# Patient Record
Sex: FEMALE | Race: BLACK OR AFRICAN AMERICAN | NOT HISPANIC OR LATINO | Employment: STUDENT | URBAN - METROPOLITAN AREA
[De-identification: names, ages, dates, MRNs, and addresses within clinical notes are randomized per-mention and may not be internally consistent; named-entity substitution may affect disease eponyms.]

---
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
## 2022-07-26 ENCOUNTER — OFFICE VISIT (OUTPATIENT)
Dept: OBSTETRICS AND GYNECOLOGY | Age: 22
End: 2022-07-26

## 2022-07-26 VITALS
HEIGHT: 65 IN | BODY MASS INDEX: 22.82 KG/M2 | WEIGHT: 137 LBS | SYSTOLIC BLOOD PRESSURE: 118 MMHG | DIASTOLIC BLOOD PRESSURE: 70 MMHG

## 2022-07-26 DIAGNOSIS — Z11.3 SCREEN FOR STD (SEXUALLY TRANSMITTED DISEASE): ICD-10-CM

## 2022-07-26 DIAGNOSIS — Z30.09 CONTRACEPTIVE EDUCATION: Primary | ICD-10-CM

## 2022-07-26 PROCEDURE — 99213 OFFICE O/P EST LOW 20 MIN: CPT | Performed by: PHYSICIAN ASSISTANT

## 2022-07-26 RX ORDER — NORETHINDRONE ACETATE AND ETHINYL ESTRADIOL AND FERROUS FUMARATE 1MG-20(24)
1 KIT ORAL DAILY
Qty: 28 TABLET | Refills: 12 | Status: SHIPPED | OUTPATIENT
Start: 2022-07-26 | End: 2022-11-30

## 2022-07-26 NOTE — PROGRESS NOTES
"Subjective     Chief Complaint   Patient presents with   • Consult     New pt consult for birth control.  Was using the patch but stopped recently.       Gianluca Leyva is a 22 y.o.  whose LMP is Patient's last menstrual period was 2022 (approximate). presents with questions about contraception    She did see a gynecologist last year and was put on a patch but started having btb on it and stopped it  Has been on pills before and may be open to retrying them  Also considering nexplanon    Med hx-osteogenesis imperfecta    Dr Callahan is pcp    She is from [place]  Attending , [employer], public health    No Additional Complaints Reported    The following portions of the patient's history were reviewed and updated as appropriate:vital signs, allergies, current medications, past family history, past medical history, past social history, past surgical history and problem list      Review of Systems   Genitourinary:positive for contraceptive counseling     Objective      /70   Ht 165.1 cm (65\")   Wt 62.1 kg (137 lb)   LMP 2022 (Approximate)   Breastfeeding No   BMI 22.80 kg/m²     Physical Exam    General:   alert, comfortable and no distress   Heart: Not performed today   Lungs: Not performed today.   Breast: Not performed today   Neck: Not performed today   Abdomen: Not performed today   CVA: Not performed today   Pelvis: Not performed today   Extremities: Not performed today   Neurologic: negative   Psychiatric: Normal affect, judgement, and mood       Lab Review   Labs: No data reviewed    Imaging   No data reviewed    Assessment & Plan     ASSESSMENT  1. Contraceptive education    2. Screen for STD (sexually transmitted disease)          PLAN  1.   Orders Placed This Encounter   Procedures   • Chlamydia trachomatis, Neisseria gonorrhoeae, PCR - Swab, Cervix       2. Medications prescribed this encounter:        New Medications Ordered This Visit   Medications   • norethindrone-ethinyl " estradiol-ferrous fumarate (LOESTIN 24 FE) 1-20 MG-MCG(24) per tablet     Sig: Take 1 tablet by mouth Daily.     Dispense:  28 tablet     Refill:  12       3. Plan bcp. New rx sent in. Birth control risks were discussed with patient including risks of blood clots, strokes and DVT's. Also discussed proper use, start pill on first day of menses.  Use for one full month prior to having unprotected intercourse. BCP do not prevent against STD's, therefore condoms should be used at all times. Pt can expect irregular bleeding the first three months of use.     Sign PW for nexplanon. May consider having this placed if not tolerating bcp    Follow up: 1 year(s)    SOLIS Lovell  7/26/2022

## 2022-07-28 LAB
C TRACH RRNA SPEC QL NAA+PROBE: NEGATIVE
N GONORRHOEA RRNA SPEC QL NAA+PROBE: NEGATIVE

## 2022-10-12 ENCOUNTER — OFFICE VISIT (OUTPATIENT)
Dept: OBSTETRICS AND GYNECOLOGY | Age: 22
End: 2022-10-12

## 2022-10-12 VITALS
DIASTOLIC BLOOD PRESSURE: 62 MMHG | BODY MASS INDEX: 22.92 KG/M2 | SYSTOLIC BLOOD PRESSURE: 110 MMHG | WEIGHT: 137.6 LBS | HEIGHT: 65 IN

## 2022-10-12 DIAGNOSIS — Z3A.01 LESS THAN 8 WEEKS GESTATION OF PREGNANCY: ICD-10-CM

## 2022-10-12 DIAGNOSIS — Q78.0 OSTEOGENESIS IMPERFECTA: Primary | ICD-10-CM

## 2022-10-12 DIAGNOSIS — Q78.0 OSTEOGENESIS IMPERFECTA: ICD-10-CM

## 2022-10-12 DIAGNOSIS — Z11.3 SCREEN FOR STD (SEXUALLY TRANSMITTED DISEASE): ICD-10-CM

## 2022-10-12 DIAGNOSIS — Z34.90 EARLY STAGE OF PREGNANCY: Primary | ICD-10-CM

## 2022-10-12 PROCEDURE — 90471 IMMUNIZATION ADMIN: CPT | Performed by: PHYSICIAN ASSISTANT

## 2022-10-12 PROCEDURE — 90686 IIV4 VACC NO PRSV 0.5 ML IM: CPT | Performed by: PHYSICIAN ASSISTANT

## 2022-10-12 PROCEDURE — 99213 OFFICE O/P EST LOW 20 MIN: CPT | Performed by: PHYSICIAN ASSISTANT

## 2022-10-12 NOTE — PROGRESS NOTES
"Subjective     Chief Complaint   Patient presents with   • Follow-up     GYN F/U for Pregnancy confirmation, LMP 2022, U/S today, Pt has no complaints today        Gianluca Leyva is a 22 y.o.  whose LMP is Patient's last menstrual period was 2022. presents for new ob visit  She is accompanied by mom  This is unplanned  FOB is involved but she prefers not to give his info    First pregnancy  Med hx-osteogenesis imperfecta-she has h/o over 200 fxs    Has mild nausea and fatigue   Just sleep and declines need for meds     No Additional Complaints Reported    The following portions of the patient's history were reviewed and updated as appropriate:vital signs, allergies, current medications, past family history, past medical history, past social history, past surgical history and problem list      Review of Systems   Genitourinary:positive for early stage of pregnancy     Objective      /62   Ht 165.1 cm (65\")   Wt 62.4 kg (137 lb 9.6 oz)   LMP 2022   BMI 22.90 kg/m²     Physical Exam    General:   alert, comfortable and no distress   Heart: Not performed today   Lungs: Not performed today.   Breast: Not performed today   Neck: Not performed today   Abdomen: Not performed today   CVA: Not performed today   Pelvis: Not performed today   Extremities: Not performed today   Neurologic: negative   Psychiatric: Normal affect, judgement, and mood       Lab Review   Labs: No data reviewed    Imaging   Ultrasound - Pelvic Vaginal    Intrauterine pregnancy at 7 wks 6 days  Viable first trimester gestation,    KAREN based on u/s is 2023  KAREN based on LMP is 2023    Assessment & Plan     ASSESSMENT  1. Early stage of pregnancy    2. Screen for STD (sexually transmitted disease)    3. Osteogenesis imperfecta          PLAN  1.   Orders Placed This Encounter   Procedures   • Chlamydia trachomatis, Neisseria gonorrhoeae, PCR - Urine, Urine, Clean Catch   • FluLaval/Fluzone >6 mos " ([id number])   • OB Panel With HIV       2. Plan prenatal labs and rpt std testing today. U/s done and is c/w dates. Pt notes mild nausea and fatigue. Is taking PNV. utd on covid vaccine. Flu vaccine received today.   Briefly disc Osteogenesis imperfecta and pregnancy. May require MFM consult  Mom is here with pt and notes that she did have  with all 4 children. 3 out of 4 children do have OI    Follow up: 3 week(s)    SOLIS Lovell  10/12/2022

## 2022-10-13 LAB
ABO GROUP BLD: NORMAL
BASOPHILS # BLD AUTO: NORMAL X10E3/UL
BASOPHILS NFR BLD AUTO: NORMAL %
BLD GP AB SCN SERPL QL: NEGATIVE
EOSINOPHIL # BLD AUTO: NORMAL X10E3/UL
EOSINOPHIL NFR BLD AUTO: NORMAL %
ERYTHROCYTE [DISTWIDTH] IN BLOOD BY AUTOMATED COUNT: NORMAL %
HBV SURFACE AG SERPL QL IA: NEGATIVE
HCT VFR BLD AUTO: NORMAL %
HCV AB S/CO SERPL IA: <0.1 S/CO RATIO (ref 0–0.9)
HGB BLD-MCNC: NORMAL G/DL
HIV 1+2 AB+HIV1 P24 AG SERPL QL IA: NON REACTIVE
IMM GRANULOCYTES # BLD AUTO: NORMAL X10E3/UL
IMM GRANULOCYTES NFR BLD AUTO: NORMAL %
IMMATURE CELLS: NORMAL
LYMPHOCYTES # BLD AUTO: NORMAL X10E3/UL
LYMPHOCYTES NFR BLD AUTO: NORMAL %
MCH RBC QN AUTO: NORMAL PG
MCHC RBC AUTO-ENTMCNC: NORMAL G/DL
MCV RBC AUTO: NORMAL FL
MONOCYTES # BLD AUTO: NORMAL X10E3/UL
MONOCYTES NFR BLD AUTO: NORMAL %
MORPHOLOGY BLD-IMP: NORMAL
NEUTROPHILS # BLD AUTO: NORMAL X10E3/UL
NEUTROPHILS NFR BLD AUTO: NORMAL %
NRBC BLD AUTO-RTO: NORMAL %
PLATELET # BLD AUTO: NORMAL X10E3/UL
RBC # BLD AUTO: NORMAL X10E6/UL
REQUEST PROBLEM: NORMAL
RH BLD: POSITIVE
RPR SER QL: NON REACTIVE
RUBV IGG SERPL IA-ACNC: 3.33 INDEX
WBC # BLD AUTO: NORMAL X10E3/UL

## 2022-10-14 LAB
C TRACH RRNA SPEC QL NAA+PROBE: NEGATIVE
N GONORRHOEA RRNA SPEC QL NAA+PROBE: NEGATIVE

## 2022-10-18 ENCOUNTER — APPOINTMENT (OUTPATIENT)
Dept: ULTRASOUND IMAGING | Facility: HOSPITAL | Age: 22
End: 2022-10-18

## 2022-10-19 ENCOUNTER — APPOINTMENT (OUTPATIENT)
Dept: ULTRASOUND IMAGING | Facility: HOSPITAL | Age: 22
End: 2022-10-19

## 2022-10-21 ENCOUNTER — OFFICE VISIT (OUTPATIENT)
Dept: OBSTETRICS AND GYNECOLOGY | Facility: CLINIC | Age: 22
End: 2022-10-21

## 2022-10-21 ENCOUNTER — HOSPITAL ENCOUNTER (OUTPATIENT)
Dept: ULTRASOUND IMAGING | Facility: HOSPITAL | Age: 22
Discharge: HOME | End: 2022-10-21
Admitting: STUDENT IN AN ORGANIZED HEALTH CARE EDUCATION/TRAINING PROGRAM

## 2022-10-21 VITALS
TEMPERATURE: 98.6 F | DIASTOLIC BLOOD PRESSURE: 67 MMHG | SYSTOLIC BLOOD PRESSURE: 114 MMHG | BODY MASS INDEX: 23.29 KG/M2 | WEIGHT: 139.8 LBS | HEART RATE: 69 BPM | HEIGHT: 65 IN

## 2022-10-21 DIAGNOSIS — Z82.79 FAMILY HISTORY OF OSTEOGENESIS IMPERFECTA: ICD-10-CM

## 2022-10-21 DIAGNOSIS — Q78.0 OSTEOGENESIS IMPERFECTA TYPE I: Primary | ICD-10-CM

## 2022-10-21 DIAGNOSIS — Z3A.01 LESS THAN 8 WEEKS GESTATION OF PREGNANCY: ICD-10-CM

## 2022-10-21 DIAGNOSIS — Q78.0 OSTEOGENESIS IMPERFECTA: ICD-10-CM

## 2022-10-21 PROCEDURE — 99215 OFFICE O/P EST HI 40 MIN: CPT | Performed by: OBSTETRICS & GYNECOLOGY

## 2022-10-21 PROCEDURE — 76801 OB US < 14 WKS SINGLE FETUS: CPT | Performed by: OBSTETRICS & GYNECOLOGY

## 2022-10-21 PROCEDURE — 76801 OB US < 14 WKS SINGLE FETUS: CPT

## 2022-10-21 NOTE — PROGRESS NOTES
Pt. Reports that she is doing well and denies vaginal bleeding, cramping, contractions or leaking of fluid at this time. Reports no fetal movement at this time.  Denies headache, visual changes or epigastric pain.  Denies any additional complaints at time of appointment.  Next OB appointment scheduled for 11/2/2022.    Vitals:    10/21/22 0954   BP: 114/67   Pulse: 69   Temp: 98.6 °F (37 °C)

## 2022-10-21 NOTE — PROGRESS NOTES
[facility] Consult    Dear Dr. Judge   Thank-you for referring Gianluca Leyva for a Maternal Fetal Medicine consult . As you know Ms. Gianluca Leyva is a 22 y.o.  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 Patient is 9.2 weeks today.     Family hx with a strong hx of OI Type 1 aka classic nondeforming OI.  Patient brought her mother in today who also has OI type I.  Additionally 3 of her other siblings have OI and her maternal grandmother.  Her mother and siblings course and had issues with fractures her mother did have all vaginal deliveries and a history of a  delivery with the patient.    Patient has a history of multiple fractures the patient's mother states she has had more fractures than her other kids however she was very active in sports during her life.    Patient denies any obstetrical issues or problems today.  States the father the baby does not have any history of OI.            OB History    Para Term  AB Living   1 0 0 0 0 0   SAB IAB Ectopic Molar Multiple Live Births   0 0 0 0 0 0      # Outcome Date GA Lbr Bigg/2nd Weight Sex Delivery Anes PTL Lv   1 Current              The remainder of her pregnancy has been uncomplicated.   has a past medical history of Osteogenesis imperfecta.   has a past surgical history that includes Knee Arthroplasty; Shoulder arthroscopy; and Elbow Arthroplasty.  No Known Allergies    Current Outpatient Medications:   •  norethindrone-ethinyl estradiol-ferrous fumarate (LOESTIN 24 FE) 1-20 MG-MCG(24) per tablet, Take 1 tablet by mouth Daily., Disp: 28 tablet, Rfl: 12  family history is not on file.  Social History     Tobacco Use   • Smoking status: Never   • Smokeless tobacco: Never   Vaping Use   • Vaping Use: Never used   Substance Use Topics   • Alcohol use: Never   • Drug use: Never       PHYSICAL EXAM:  /67 (BP Location: Right arm, Patient Position: Sitting)   Pulse 69   Temp 98.6 °F (37 °C)  "(Temporal)   Ht 165.1 cm (65\")   Wt 63.4 kg (139 lb 12.8 oz)   LMP 2022   BMI 23.26 kg/m²   General: pleasant, alert and oriented  Abdomen: soft, non tender, gravid  Extremities: bilat lower extremities soft, non tender, no edema noted    Ultrasound   Ultrasound evaluation reveals a fetus measuring 9 1/7 weeks .  Normal cardiac activity.       ASSESSMENT:    Diagnoses and all orders for this visit:    1. Osteogenesis imperfecta type I (Primary)  Overview:  Hx of multiple fractures      2. Family history of osteogenesis imperfecta  Overview:  Patient's mother, three other siblings, and maternal grandmother         Recommendations:  Osteogenesis imperfecta type I (COLIA1/2-OI): This is also known as classic nondeforming OI with  blue sclera.  Inheritance is autosomal dominant as you can see with the patient and her family history.  Typically patients have multiple fractures.  They can be normal to short stature, with blue sclera.  Also hearing loss is a possibility as they get into adulthood.     During pregnancy there is any increased risk and joint laxity and there is some increased risk in  labor.  There does not seem to be any difference in frequency of complications between a vaginal delivery and a .  Therefore I would recommend a vaginal delivery and a  only for other obstetrical or maternal reasons. Anesthesia needs to be aware of this patient at the time of admission and labor and delivery.    Since this is the patient's first pregnancy I would recommend serial cervical lengths beginning around 15 weeks gestation all the way till 25 weeks gestation.    I talked briefly with this patient about prenatal diagnosis by means of a CVS or amniocentesis.  I am looking further in to this information and then will relate to the patient. I would need to the mutation. However prenatal diagnosis will not predict severity.    Patient's mom stated that the patient had a vitamin D deficiency " and was on vitamin D for some time.  I would recommend checking a vitamin D level with her prenatal labs and then treating accordingly and if her vitamin D levels are normal I would at least place her on 2000 international units daily.    All of their questions were answered to completion.  Plan for follow-up visit in 3 weeks.        Evelyn Sweet, DO FACOG  Maternal Fetal Medicine     Again thank-you for referring for a maternal fetal medicine consult. If we can be of any further assistance to you please do not hesitate to contact us.    Total consult time 50 minutes with >50% spent face to face and reviewing labs, charting and coordination of care.

## 2022-11-02 ENCOUNTER — INITIAL PRENATAL (OUTPATIENT)
Dept: OBSTETRICS AND GYNECOLOGY | Age: 22
End: 2022-11-02

## 2022-11-02 VITALS — DIASTOLIC BLOOD PRESSURE: 66 MMHG | SYSTOLIC BLOOD PRESSURE: 120 MMHG | BODY MASS INDEX: 22.96 KG/M2 | WEIGHT: 138 LBS

## 2022-11-02 DIAGNOSIS — Q78.0 OSTEOGENESIS IMPERFECTA: ICD-10-CM

## 2022-11-02 DIAGNOSIS — Q78.0 OSTEOGENESIS IMPERFECTA TYPE I: ICD-10-CM

## 2022-11-02 DIAGNOSIS — E55.9 VITAMIN D DEFICIENCY: ICD-10-CM

## 2022-11-02 DIAGNOSIS — Z3A.11 11 WEEKS GESTATION OF PREGNANCY: Primary | ICD-10-CM

## 2022-11-02 DIAGNOSIS — Z13.89 SCREENING FOR HEMATURIA OR PROTEINURIA: ICD-10-CM

## 2022-11-02 LAB
CLARITY, POC: CLEAR
COLOR UR: YELLOW
GLUCOSE UR STRIP-MCNC: NEGATIVE MG/DL
PROT UR STRIP-MCNC: NEGATIVE MG/DL

## 2022-11-02 PROCEDURE — 0501F PRENATAL FLOW SHEET: CPT | Performed by: STUDENT IN AN ORGANIZED HEALTH CARE EDUCATION/TRAINING PROGRAM

## 2022-11-02 NOTE — PROGRESS NOTES
[facility]   Obstetrics and Gynecology   New Obstetric Visit    2022    Patient: Gianluca Leyva          MR#:[id number]    History of Present Illness    Chief Complaint   Patient presents with   • Initial Prenatal Visit     OB intake, 11w0d, No problens today       22 y.o. female  at 11w0d presents for NOB visit.  She is doing well today.  Taking prenatal vitamins.  Here with FOB Jason.  They are excited but nervous about pregnancy.    Patient has personal history of osteogenesis imperfecta with multiple fractures.    ________________________________________  Patient Active Problem List   Diagnosis   • Osteogenesis imperfecta type I   • Family history of osteogenesis imperfecta   • 11 weeks gestation of pregnancy   • Vitamin D deficiency     OB History        1    Para   0    Term   0       0    AB   0    Living   0       SAB   0    IAB   0    Ectopic   0    Molar   0    Multiple   0    Live Births   0                Social History:  Denies h/o gonorrhea, chlamydia, herpes, syphilis, HIV  Strong FHx osteogenesis imperfecta    Past Medical History:   Diagnosis Date   • Osteogenesis imperfecta      Past Surgical History:   Procedure Laterality Date   • ELBOW ARTHROPLASTY     • KNEE ARTHROPLASTY     • SHOULDER ARTHROSCOPY       Social History     Tobacco Use   Smoking Status Never   Smokeless Tobacco Never     Family History   Problem Relation Age of Onset   • No Known Problems Father    • Osteogenesis imperfecta Mother    • Osteogenesis imperfecta Brother    • Osteogenesis imperfecta Brother    • Breast cancer Neg Hx    • Ovarian cancer Neg Hx    • Uterine cancer Neg Hx    • Colon cancer Neg Hx      Prior to Admission medications    Medication Sig Start Date End Date Taking? Authorizing Provider   Prenatal MV & Min w/FA-DHA (PRENATAL ADULT GUMMY/DHA/FA PO) Take 1 tablet by mouth Daily.   Yes Provider, MD Jeancarlos   norethindrone-ethinyl estradiol-ferrous fumarate (LOESTIN 24  "FE) 1-20 MG-MCG(24) per tablet Take 1 tablet by mouth Daily. 7/26/22 7/26/23  Sherlyn Mcpherson PA     ________________________________________    The following portions of the patient's history were reviewed and updated as appropriate: allergies, current medications, past family history, past medical history, past social history, past surgical history and problem list.    Review of Systems   All other systems reviewed and are negative.           Objective     /66   Wt 62.6 kg (138 lb)   LMP 08/17/2022   BMI 22.96 kg/m²    BP Readings from Last 3 Encounters:   11/02/22 120/66   10/21/22 114/67   10/12/22 110/62      Wt Readings from Last 3 Encounters:   11/02/22 62.6 kg (138 lb)   10/21/22 63.4 kg (139 lb 12.8 oz)   10/12/22 62.4 kg (137 lb 9.6 oz)        BMI: Estimated body mass index is 22.96 kg/m² as calculated from the following:    Height as of 10/21/22: 165.1 cm (65\").    Weight as of this encounter: 62.6 kg (138 lb).    Physical Exam  Vitals and nursing note reviewed.   Constitutional:       General: She is not in acute distress.     Appearance: Normal appearance.   Pulmonary:      Effort: Pulmonary effort is normal. No respiratory distress.   Abdominal:      General: There is no distension.      Palpations: Abdomen is soft.      Tenderness: There is no abdominal tenderness.   Musculoskeletal:         General: Normal range of motion.   Skin:     General: Skin is warm and dry.   Neurological:      General: No focal deficit present.      Mental Status: She is alert.   Psychiatric:         Mood and Affect: Mood normal.         Behavior: Behavior normal.         Thought Content: Thought content normal.         Judgment: Judgment normal.           Assessment:  Diagnoses and all orders for this visit:    1. 11 weeks gestation of pregnancy (Primary)  Overview:  -PNL: Rh pos, infectious testing normal, CBC today  -Pap: n/a  -Genetics: screening options reviewed, Bessie pamphlet provided, desires " cfDNA/carrier screen today, plan for msAFP > 16wga, plan for anatomy Us at 18-22 wga with [facility]  -Imm: RI, mary unk (collect with next blood draw), tdap > 27wga, s/p covid vaccine, rec'd booster, s/p flu shot  -Contraception: discuss at future visit  -Birthplan: discuss at future visit  -Care coordination: accepts blood transfusions, no latex allergy, call schedule reviewed      Orders:  -     CBC (No Diff)  -     Vitamin D 25 Hydroxy  -     Cancel: Bessie Panorama Prenatal Test Full Panel: Panorama Test Plus 5 Additional Microdeletions - Blood,  -     Cancel: Bessie Horizon 14 (Pan-Ethnic Standard) - Blood,  -     Cancel: Bessie Panorama Prenatal Test: Chromosomes 13, 18, 21, X & Y: Triploidy 22Q.11.2 Deletion - Blood,  -     Cancel: Bessie Horizon 14 (Pan-Ethnic Standard) - Blood,  -     Cancel: Bessie Panorama Prenatal Test: Chromosomes 13, 18, 21, X & Y: Triploidy 22Q.11.2 Deletion - Blood,  -     Bessie Horizon 14 (Pan-Ethnic Standard) - Blood, Blood, Venous  -     Bessie Panorama Prenatal Test: Chromosomes 13, 18, 21, X & Y: Triploidy 22Q.11.2 Deletion - Blood, Blood, Venous    2. Screening for hematuria or proteinuria  -     POC Urinalysis Dipstick  -     Urine Culture - Urine, Urine, Clean Catch    3. Osteogenesis imperfecta  -     CBC (No Diff)  -     Vitamin D 25 Hydroxy  -     Cancel: Bessie Panorama Prenatal Test: Chromosomes 13, 18, 21, X & Y: Triploidy 22Q.11.2 Deletion - Blood,  -     Cancel: Bessie Panorama Prenatal Test: Chromosomes 13, 18, 21, X & Y: Triploidy 22Q.11.2 Deletion - Blood,    4. Osteogenesis imperfecta type I  Overview:  -Patient, mother, and her two brothers all with OI, h/o multiple fractures  -S/p [facility] consult - recs for vaginal delivery unless other obstetric indication for , also recs for CL screening from 15-25 wga, unclear if this will be at [facility] office or my office but scheduled to see M  and will clarify  -MFM discussed prenatal dx of OI with CVS vs amnio, patient  declined CVS but will consider amnio      5. Vitamin D deficiency  Overview:  -reported h/o vit D deficiency  -check vit D level today and will supplement if necessary            Plan:  Return in about 4 weeks (around 11/30/2022) for Next f/u.      Kim Judge MD  11/2/2022 15:54 EDT

## 2022-11-03 LAB
25(OH)D3+25(OH)D2 SERPL-MCNC: 25.2 NG/ML (ref 30–100)
ERYTHROCYTE [DISTWIDTH] IN BLOOD BY AUTOMATED COUNT: 12.4 % (ref 12.3–15.4)
HCT VFR BLD AUTO: 37.9 % (ref 34–46.6)
HGB BLD-MCNC: 13.1 G/DL (ref 12–15.9)
MCH RBC QN AUTO: 31.6 PG (ref 26.6–33)
MCHC RBC AUTO-ENTMCNC: 34.6 G/DL (ref 31.5–35.7)
MCV RBC AUTO: 91.3 FL (ref 79–97)
PLATELET # BLD AUTO: 318 10*3/MM3 (ref 140–450)
RBC # BLD AUTO: 4.15 10*6/MM3 (ref 3.77–5.28)
WBC # BLD AUTO: 6.98 10*3/MM3 (ref 3.4–10.8)

## 2022-11-04 LAB
BACTERIA UR CULT: NORMAL
BACTERIA UR CULT: NORMAL

## 2022-11-04 RX ORDER — ERGOCALCIFEROL 1.25 MG/1
50000 CAPSULE ORAL WEEKLY
Qty: 12 CAPSULE | Refills: 3 | Status: SHIPPED | OUTPATIENT
Start: 2022-11-04 | End: 2023-02-20

## 2022-11-07 NOTE — PROGRESS NOTES
MATERNAL FETAL MEDICINE Consult Note    Dear Dr Kim Judge MD:    Thank you for your kind referral of Gianluca Leyva.  As you know, she is a 22 y.o. G1  P  at  12 2 /7 weeks gestation (Estimated Date of Delivery: 5/24/23). This is a consult.      Her antepartum course is complicated by:  Maternal Osteogenesis imperfecta Type 1    Aneuploidy Screening:      HPI: Today, she denies headache, blurry vision, RUQ pain. No vaginal bleeding, no contractions.     Review of History:  Past Medical History:   Diagnosis Date   • Osteogenesis imperfecta      Past Surgical History:   Procedure Laterality Date   • ELBOW ARTHROPLASTY     • KNEE ARTHROPLASTY     • MUSCLE REPAIR      shoulder   • SHOULDER ARTHROSCOPY         OB Hx:    Social History     Socioeconomic History   • Marital status: Single   Tobacco Use   • Smoking status: Never   • Smokeless tobacco: Never   Vaping Use   • Vaping Use: Never used   Substance and Sexual Activity   • Alcohol use: Never   • Drug use: Never   • Sexual activity: Yes     Partners: Male     Birth control/protection: None     Family History   Problem Relation Age of Onset   • No Known Problems Father    • Osteogenesis imperfecta Mother    • Osteogenesis imperfecta Brother    • Osteogenesis imperfecta Brother    • Breast cancer Neg Hx    • Ovarian cancer Neg Hx    • Uterine cancer Neg Hx    • Colon cancer Neg Hx       No Known Allergies   Current Outpatient Medications on File Prior to Visit   Medication Sig Dispense Refill   • Prenatal MV & Min w/FA-DHA (PRENATAL ADULT GUMMY/DHA/FA PO) Take 1 tablet by mouth Daily.     • norethindrone-ethinyl estradiol-ferrous fumarate (LOESTIN 24 FE) 1-20 MG-MCG(24) per tablet Take 1 tablet by mouth Daily. 28 tablet 12   • vitamin D (ERGOCALCIFEROL) 1.25 MG (17674 UT) capsule capsule Take 1 capsule by mouth 1 (One) Time Per Week. 12 capsule 3     No current facility-administered medications on file prior to visit.        Past obstetric, gynecological,  medical, surgical, family and social history reviewed.  Relevant lab work and imaging reviewed.    Review of systems  Constitutional:  denies fever, chills, malaise.   ENT/Mouth:  denies sore throat, tinnitis  Eyes: denies vision changes/pain  CV:  denies chest pain  Respiratory:  denies cough/SOB  GI:  denies N/V, diarrhea, abdominal pain.    :   denies dysuria  Skin:  denies lesions or pruritis   Neuro:  denies weakness, focal neurologic symptoms    Vitals:    22 1044   BP: 102/58   BP Location: Right arm   Patient Position: Sitting   Pulse: 81   Temp: 98 °F (36.7 °C)   TempSrc: Oral       PHYSICAL EXAM   GENERAL: Not in acute distress, AAOx3, pleasant  CARDIO: regular rate and rhythm  PULM: symmetric chest rise, speaking in complete sentences without difficulty  NEURO: awake, alert and oriented to person, place, and time  ABDOMINAL: No fundal tenderness, no rebound or guarding, gravid  EXTREMITIES: no bilateral lower extremity edema/tenderness  SKIN: Warm, well-perfused      ULTRASOUND   Please view full ultrasound note on Imaging tab in ViewPoint.  Normal early intrauterine pregnancy.     bpm, which is normal.  Nuchal translucency 1.8 mm which is normal.      ASSESSMENT/COUNSELIN y.o. G1  P  at  12 2 /7 weeks gestation (Estimated Date of Delivery: 23).     -Pregnancy  [ X ] stable  [   ] improving [  ] worsening    Diagnoses and all orders for this visit:    1. Osteogenesis imperfecta (Primary)  -     [facility]ess[facility] Prenatal Test: Chromosomes 13, 18, 21, X & Y: Triploidy 22Q.11.2 Deletion - Blood,; Future         Osteogenesis  The patient and many of her family members have OI Type 1.  She has not had a fracture in many years--since she was a child.  Her mother has OI and had all vaginal deliveries without issues. I counseled the patient about the genetic nature of OI and risk to her future children.  Most forms of OI are inherited in an autosomal dominant fashion, which would be  associated with a 50% risk to children of an affected individual.  However, there is variable phenotypic expression among affected individuals.  She has previously been counseled about Amniocentesis and she is unsure if she wants this.  I encouraged her that it was perfectly acceptable to test after the baby was born and it would not affect delivery recommendations in this situation.      Patients with OI type 1 may also have varying symptoms of brittle teeth, blue sclerae, hearing loss, breathing difficulties, and cardiac valvular regurgitation.    90% of patients with OI have pathogenic (disease-causing) DNA changes in the COL1A1 or the COL1A2 genes. Changes in COL1A1  and COL1A2 predominantly cause the common OI types I - IV. Many individuals with OI type I or type IV inherit the variant from an affected parent in an autosomal dominant manner.     Previously, there was suggested theory that  delivery was safer and less invasive than vaginal delivery for a fetus diagnosed with OI although there was no evidence to support this theory. A recent, large-cohort analysis of 540 individuals showed that delivery by caesarean section is not associated with decreased at-birth fracture rate. Comparisons of patients with diagnoses of OI types I, II, IV showed that at-birth fractures did not occur at a higher rate between vaginal and caesarean delivery.   They found that 92.6% of type 3 OI, 50.7% of type 4 OI, and 17.2% of type 1 OI cases had at-birth fractures regardless of delivery and that babies that showed prenatal fractures were more likely to be delivered by  but this did not decrease fracture development at birth. It should be noted that approximately 40% of OI type 3 cases were breech presentations as compared to the general population (5%). Therefore caesarean section should be performed for usual obstetric indications but not solely for the prevention of new fractures (PMID:  99379469).    References:   Lisa S, Noel M, Edil D, Elsa D, Susie JR, Terry RD, Mitch PA, Nara MB, Teddy T, Sandeep J, Stef M, Viviana PH, Nathaniel M, Ruy M, Leisa FH, Daria F, Yuniel VR, Matthew B; Members of the BBD Consortium, Parth YANCEY.  delivery is not associated with decreased at-birth fracture rates in osteogenesis imperfecta. Mariah Med. 2016 Luis;18(6):570-6. doi: 10.1038/gim.2015.131. Epub 2015 Oct 1. PMID: 77366134; PMCID: HCI2254612.    I discussed this with the patient and relayed that ultimately delivery would be a risk/benefit discussion between she and her primary OB.      I also discussed Vistara testing today as a non-invasive screening test for osteogenesis imperfecta types I-IV.  We reviewed that this test has the possibility of false negatives and false positives and is therefore not a diagnostic test. She is interested in this and we did order it today.      We will do cervical length screening given her altered collagen cross-linking.  She can have these either at the [facility] office or primary OB.    I would do CL screening every 2 weeks unless her cervix becomes <3 cm, then weekly for between 2.5-3 cm.      Summary of Plan  -Vistara testing today  -Follow up for cervical length screening at 16 weeks    Follow-up: 4 weeks    Thank you for the consult and opportunity to care for this patient.  Please feel free to reach out with any questions or concerns.      I spent 30 minutes caring for this patient on this date of service. This time includes time spent by me in the following activities: preparing for the visit, reviewing tests, obtaining and/or reviewing a separately obtained history, performing a medically appropriate examination and/or evaluation, counseling and educating the patient/family/caregiver and independently interpreting results and communicating that information with the patient/family/caregiver with greater than 50% spent in counseling and coordination of  care.     Malissa White MD [facility] – [place], [place]  Maternal Fetal Medicine-[facility]  Office: [phone]  [email or web address]

## 2022-11-10 ENCOUNTER — TELEPHONE (OUTPATIENT)
Dept: OBSTETRICS AND GYNECOLOGY | Age: 22
End: 2022-11-10

## 2022-11-10 NOTE — TELEPHONE ENCOUNTER
Caller: CAREYLUKE    Relationship: SELF    Best call back number: [phone]    What is the best time to reach you: AFTER 2 PM     Who are you requesting to speak with (clinical staff, provider,  specific staff member): UNKNOWN    Do you know the name of the person who called: NO     What was the call regarding: PT HAD A MISSED CALL FROM OFFICE AND WAS RETURNING THE CALL    Do you require a callback: YES

## 2022-11-11 ENCOUNTER — LAB (OUTPATIENT)
Dept: LAB | Facility: HOSPITAL | Age: 22
End: 2022-11-11

## 2022-11-11 ENCOUNTER — OFFICE VISIT (OUTPATIENT)
Dept: OBSTETRICS AND GYNECOLOGY | Facility: CLINIC | Age: 22
End: 2022-11-11

## 2022-11-11 ENCOUNTER — HOSPITAL ENCOUNTER (OUTPATIENT)
Dept: ULTRASOUND IMAGING | Facility: HOSPITAL | Age: 22
Discharge: HOME | End: 2022-11-11

## 2022-11-11 VITALS — HEART RATE: 81 BPM | SYSTOLIC BLOOD PRESSURE: 102 MMHG | TEMPERATURE: 98 F | DIASTOLIC BLOOD PRESSURE: 58 MMHG

## 2022-11-11 DIAGNOSIS — Q78.0 OSTEOGENESIS IMPERFECTA: Primary | ICD-10-CM

## 2022-11-11 DIAGNOSIS — Q78.0 OSTEOGENESIS IMPERFECTA TYPE I: ICD-10-CM

## 2022-11-11 DIAGNOSIS — Q78.0 OSTEOGENESIS IMPERFECTA: ICD-10-CM

## 2022-11-11 PROCEDURE — 99214 OFFICE O/P EST MOD 30 MIN: CPT | Performed by: OBSTETRICS & GYNECOLOGY

## 2022-11-11 PROCEDURE — 76801 OB US < 14 WKS SINGLE FETUS: CPT | Performed by: OBSTETRICS & GYNECOLOGY

## 2022-11-11 PROCEDURE — 76801 OB US < 14 WKS SINGLE FETUS: CPT

## 2022-11-11 PROCEDURE — 36415 COLL VENOUS BLD VENIPUNCTURE: CPT

## 2022-11-11 NOTE — PROGRESS NOTES
Pt reports that she is doing well and denies vaginal bleeding, cramping, contractions or LOF at this time. Denies HA, visual changes or epigastric pain. Denies any additional complaints at time of appointment. Next OB appointment scheduled for 11/30.    Vitals:    11/11/22 1044   BP: 102/58   Pulse: 81   Temp: 98 °F (36.7 °C)

## 2022-11-18 ENCOUNTER — TELEPHONE (OUTPATIENT)
Dept: OBSTETRICS AND GYNECOLOGY | Age: 22
End: 2022-11-18

## 2022-11-18 NOTE — TELEPHONE ENCOUNTER
Phone call received from [facility] requesting order for Vistara testing.  Vistara testing was ordered on 11/11/2022 by Dr. White and specimen was submitted on that day.  Information to contact Dr. White's office for the order was given to Kiera at [facility] and understanding verbalized.

## 2022-11-30 ENCOUNTER — ROUTINE PRENATAL (OUTPATIENT)
Dept: OBSTETRICS AND GYNECOLOGY | Age: 22
End: 2022-11-30

## 2022-11-30 VITALS — WEIGHT: 137.6 LBS | SYSTOLIC BLOOD PRESSURE: 110 MMHG | DIASTOLIC BLOOD PRESSURE: 60 MMHG | BODY MASS INDEX: 22.9 KG/M2

## 2022-11-30 DIAGNOSIS — Z3A.15 15 WEEKS GESTATION OF PREGNANCY: Primary | ICD-10-CM

## 2022-11-30 DIAGNOSIS — E55.9 VITAMIN D DEFICIENCY: ICD-10-CM

## 2022-11-30 DIAGNOSIS — Z14.8 CARRIER OF SPINAL MUSCULAR ATROPHY: ICD-10-CM

## 2022-11-30 DIAGNOSIS — Z13.89 SCREENING FOR HEMATURIA OR PROTEINURIA: ICD-10-CM

## 2022-11-30 DIAGNOSIS — Q78.0 OSTEOGENESIS IMPERFECTA TYPE I: ICD-10-CM

## 2022-11-30 LAB
CLARITY, POC: CLEAR
COLOR UR: YELLOW
GLUCOSE UR STRIP-MCNC: NEGATIVE MG/DL
PROT UR STRIP-MCNC: ABNORMAL MG/DL

## 2022-11-30 PROCEDURE — 0502F SUBSEQUENT PRENATAL CARE: CPT | Performed by: STUDENT IN AN ORGANIZED HEALTH CARE EDUCATION/TRAINING PROGRAM

## 2022-11-30 RX ORDER — VALACYCLOVIR HYDROCHLORIDE 1 G/1
TABLET, FILM COATED ORAL
COMMUNITY
Start: 2022-11-21

## 2022-11-30 NOTE — PROGRESS NOTES
Gianluca Leyva, a 22 y.o.  at 15w0d, presents for OB follow-up.  She is doing well today.  Denies VB and pain.  Here with her mother and FOB today.      Objective  BP Readings from Last 3 Encounters:   22 110/60   22 102/58   22 120/66      Wt Readings from Last 3 Encounters:   22 62.4 kg (137 lb 9.6 oz)   22 62.6 kg (138 lb)   10/21/22 63.4 kg (139 lb 12.8 oz)      Total weight gain this pregnancy: 0.272 kg (9.6 oz)    General: Awake, alert, no apparent distress  Respiratory: No increased work of breathing  Abdomen: Fundus soft and nontender  Extremity: Nontender, no edema    A/P  Diagnoses and all orders for this visit:    1. 15 weeks gestation of pregnancy (Primary)  Overview:  -PNL: Rh pos, infectious testing normal  -Pap: n/a  -Genetics: cfDNA neg, pos SMA carrier - see below, plan for msAFP > 16wga, plan for anatomy [facility] with [facility]  -Imm: RI, mary unk (unsure hx, draw titer with next bld draw), tdap > 27wga, s/p covid vaccine, rec'd booster, s/p flu shot  -Contraception: discuss at future visit  -Birthplan: discuss at future visit  -Care coordination: accepts blood transfusions, no latex allergy, call schedule reviewed        2. Screening for hematuria or proteinuria  -     POC Urinalysis Dipstick    3. Vitamin D deficiency  Overview:  -vit D 25  -moderately compliant with vit D 50503 IU weekly   -recheck vit D level around 20wga      4. Osteogenesis imperfecta type I  Overview:  -Patient, mother, and her two brothers all with OI, h/o multiple fractures  -S/p MFM consult - recs for vaginal delivery unless other obstetric indication for , also recs for CL screening from 16-25 wga, scheduled with M  - discussed that I am happy to do these as well.  She will let me know if it needs added to next appt.  -M discussed prenatal dx of OI with CVS vs amnio, patient declined CVS but will consider amnio      5. Carrier of spinal muscular atrophy  Overview:  -screen pos for  carrier status, need FOB testing - will complete today        Labor precautions reviewed  Return in about 27 days (around 12/27/2022) for Next f/u.    Kim Judge MD

## 2022-12-09 ENCOUNTER — HOSPITAL ENCOUNTER (OUTPATIENT)
Dept: ULTRASOUND IMAGING | Facility: HOSPITAL | Age: 22
Discharge: HOME | End: 2022-12-09

## 2022-12-09 ENCOUNTER — OFFICE VISIT (OUTPATIENT)
Dept: OBSTETRICS AND GYNECOLOGY | Facility: CLINIC | Age: 22
End: 2022-12-09

## 2022-12-09 ENCOUNTER — LAB (OUTPATIENT)
Dept: LAB | Facility: HOSPITAL | Age: 22
End: 2022-12-09

## 2022-12-09 VITALS
HEIGHT: 65 IN | HEART RATE: 81 BPM | WEIGHT: 137.8 LBS | TEMPERATURE: 98.2 F | BODY MASS INDEX: 22.96 KG/M2 | SYSTOLIC BLOOD PRESSURE: 136 MMHG | DIASTOLIC BLOOD PRESSURE: 73 MMHG

## 2022-12-09 DIAGNOSIS — Q78.0 OSTEOGENESIS IMPERFECTA: Primary | ICD-10-CM

## 2022-12-09 DIAGNOSIS — Q78.0 OSTEOGENESIS IMPERFECTA: ICD-10-CM

## 2022-12-09 PROCEDURE — 99213 OFFICE O/P EST LOW 20 MIN: CPT | Performed by: OBSTETRICS & GYNECOLOGY

## 2022-12-09 PROCEDURE — 76816 OB US FOLLOW-UP PER FETUS: CPT | Performed by: OBSTETRICS & GYNECOLOGY

## 2022-12-09 PROCEDURE — 76817 TRANSVAGINAL US OBSTETRIC: CPT | Performed by: OBSTETRICS & GYNECOLOGY

## 2022-12-09 PROCEDURE — 76816 OB US FOLLOW-UP PER FETUS: CPT

## 2022-12-09 PROCEDURE — 76817 TRANSVAGINAL US OBSTETRIC: CPT

## 2022-12-09 NOTE — PROGRESS NOTES
Pt. Reports that she is doing well and denies vaginal bleeding, cramping, contractions or leaking of fluid at this time. Reports no fetal movement but thinks its gas or a flutter some times.   Denies headache, visual changes or epigastric pain.  Denies any additional complaints at time of appointment.  Next OB appointment scheduled for 12/27/22    Vitals:    12/09/22 1105   BP: 136/73   Pulse: 81   Temp: 98.2 °F (36.8 °C)

## 2022-12-09 NOTE — PROGRESS NOTES
MATERNAL FETAL MEDICINE Consult Note    Dear Dr Kim Judge MD:    Thank you for your kind referral of Gianluca Leyva.  As you know, she is a 22 y.o. G1  P  at  12 2 /7 weeks gestation (Estimated Date of Delivery: 5/24/23). This is a consult.      Her antepartum course is complicated by:  Maternal Osteogenesis imperfecta Type 1    Aneuploidy Screening:      HPI: Today, she denies headache, blurry vision, RUQ pain. No vaginal bleeding, no contractions.     Review of History:  Past Medical History:   Diagnosis Date   • Osteogenesis imperfecta      Past Surgical History:   Procedure Laterality Date   • ELBOW ARTHROPLASTY     • KNEE ARTHROPLASTY     • MUSCLE REPAIR      shoulder   • SHOULDER ARTHROSCOPY         OB Hx:    Social History     Socioeconomic History   • Marital status: Single   Tobacco Use   • Smoking status: Never   • Smokeless tobacco: Never   Vaping Use   • Vaping Use: Never used   Substance and Sexual Activity   • Alcohol use: Never   • Drug use: Never   • Sexual activity: Yes     Partners: Male     Birth control/protection: None     Family History   Problem Relation Age of Onset   • No Known Problems Father    • Osteogenesis imperfecta Mother    • Osteogenesis imperfecta Brother    • Osteogenesis imperfecta Brother    • Breast cancer Neg Hx    • Ovarian cancer Neg Hx    • Uterine cancer Neg Hx    • Colon cancer Neg Hx       No Known Allergies   Current Outpatient Medications on File Prior to Visit   Medication Sig Dispense Refill   • Prenatal MV & Min w/FA-DHA (PRENATAL ADULT GUMMY/DHA/FA PO) Take 1 tablet by mouth Daily.     • valACYclovir (VALTREX) 1000 MG tablet TAKE 2 TABLETS BY MOUTH 2 TIMES EVERY DAY FOR 1 DAY     • vitamin D (ERGOCALCIFEROL) 1.25 MG (20689 UT) capsule capsule Take 1 capsule by mouth 1 (One) Time Per Week. 12 capsule 3     No current facility-administered medications on file prior to visit.        Past obstetric, gynecological, medical, surgical, family and social history  "reviewed.  Relevant lab work and imaging reviewed.    Review of systems  Constitutional:  denies fever, chills, malaise.   ENT/Mouth:  denies sore throat, tinnitis  Eyes: denies vision changes/pain  CV:  denies chest pain  Respiratory:  denies cough/SOB  GI:  denies N/V, diarrhea, abdominal pain.    :   denies dysuria  Skin:  denies lesions or pruritis   Neuro:  denies weakness, focal neurologic symptoms    Vitals:    22 1105   BP: 136/73   BP Location: Right arm   Pulse: 81   Temp: 98.2 °F (36.8 °C)   TempSrc: Temporal   Weight: 62.5 kg (137 lb 12.8 oz)   Height: 165.1 cm (65\")       PHYSICAL EXAM   GENERAL: Not in acute distress, AAOx3, pleasant  CARDIO: regular rate and rhythm  PULM: symmetric chest rise, speaking in complete sentences without difficulty  NEURO: awake, alert and oriented to person, place, and time  ABDOMINAL: No fundal tenderness, no rebound or guarding, gravid  EXTREMITIES: no bilateral lower extremity edema/tenderness  SKIN: Warm, well-perfused      ULTRASOUND   Please view full ultrasound note on Imaging tab in ViewPoint.  Cephalic presentation  Posterior placenta  previa today (but 16 weeks)  MVP 3.3cm, which is normal.    g AC 66%  Limited anatomy appears normal.  Cervix measures 3.3 cm.      ASSESSMENT/COUNSELIN y.o. G1  P  at  12 2 /7 weeks gestation (Estimated Date of Delivery: 23).     -Pregnancy  [ X ] stable  [   ] improving [  ] worsening    Diagnoses and all orders for this visit:    1. Osteogenesis imperfecta (Primary)  -     [facility]; Future  -     [facility]; Future         Osteogenesis  The patient and many of her family members have OI Type 1.  She has not had a fracture in many years--since she was a child.  Her mother has OI and had all vaginal deliveries without issues.   90% of patients with OI have pathogenic (disease-causing) DNA changes in the COL1A1 or the COL1A2 genes. Changes in COL1A1  and " COL1A2 predominantly cause the common OI types I - IV. Many individuals with OI type I or type IV inherit the variant from an affected parent in an autosomal dominant manner.     Previously, there was suggested theory that  delivery was safer and less invasive than vaginal delivery for a fetus diagnosed with OI although there was no evidence to support this theory. A recent, large-cohort analysis of 540 individuals showed that delivery by caesarean section is not associated with decreased at-birth fracture rate. Comparisons of patients with diagnoses of OI types I, II, IV showed that at-birth fractures did not occur at a higher rate between vaginal and caesarean delivery.   They found that 92.6% of type 3 OI, 50.7% of type 4 OI, and 17.2% of type 1 OI cases had at-birth fractures regardless of delivery and that babies that showed prenatal fractures were more likely to be delivered by  but this did not decrease fracture development at birth. It should be noted that approximately 40% of OI type 3 cases were breech presentations as compared to the general population (5%). Therefore caesarean section should be performed for usual obstetric indications but not solely for the prevention of new fractures (PMID: 90993508).    References:   Lisa S, Noel M, Edil D, Elsa D, Susie JR, Terry RD, Mitch PA, Nara MB, Teddy T, Sandeep J, Stef M, Viviana PH, Nathaniel M, Ruy M, Leisa FH, Daria F, Yuniel VR, Matthew B; Members of the BBD Consortium, Parth YANCEY.  delivery is not associated with decreased at-birth fracture rates in osteogenesis imperfecta. Mariah Med. 2016 Luis;18(6):570-6. doi: 10.1038/gim.2015.131. Epub 2015 Oct 1. PMID: 85793156; PMCID: [id number].    I discussed this with the patient and relayed that ultimately delivery would be a risk/benefit discussion between she and her primary OB.      I also discussed V[facility]ara testing today as a non-invasive screening test for  osteogenesis imperfecta types I-IV.  We reviewed that this test has the possibility of false negatives and false positives and is therefore not a diagnostic test. She is interested in this and we did order it today.  She had it previously but there was an issue with sample collection at [facility]--I apologized to the patient and we called abhinav and are having them follow the sample and make sure it is done correctly this time.    We will do cervical length screening given her altered collagen cross-linking.  She can have these either at the [facility] office or primary OB.    I would do CL screening every 2 weeks unless her cervix becomes <3 cm, then weekly for between 2.5-3 cm.      Summary of Plan  -Vistara testing today  -Follow up for cervical length screening in 2 weeks    Follow-up: 2 weeks CL    Thank you for the consult and opportunity to care for this patient.  Please feel free to reach out with any questions or concerns.      I spent 20 minutes caring for this patient on this date of service. This time includes time spent by me in the following activities: preparing for the visit, reviewing tests, obtaining and/or reviewing a separately obtained history, performing a medically appropriate examination and/or evaluation, counseling and educating the patient/family/caregiver and independently interpreting results and communicating that information with the patient/family/caregiver with greater than 50% spent in counseling and coordination of care.     Malissa White MD FACOG  Maternal Fetal Medicine-[facility]  Office: [phone]  [email or web address]

## 2022-12-22 ENCOUNTER — HOSPITAL ENCOUNTER (OUTPATIENT)
Dept: ULTRASOUND IMAGING | Facility: HOSPITAL | Age: 22
Discharge: HOME | End: 2022-12-22
Admitting: OBSTETRICS & GYNECOLOGY

## 2022-12-22 ENCOUNTER — OFFICE VISIT (OUTPATIENT)
Dept: OBSTETRICS AND GYNECOLOGY | Facility: CLINIC | Age: 22
End: 2022-12-22

## 2022-12-22 VITALS
WEIGHT: 141 LBS | BODY MASS INDEX: 23.46 KG/M2 | HEART RATE: 69 BPM | SYSTOLIC BLOOD PRESSURE: 104 MMHG | DIASTOLIC BLOOD PRESSURE: 50 MMHG | TEMPERATURE: 98.4 F

## 2022-12-22 DIAGNOSIS — Q78.0 OSTEOGENESIS IMPERFECTA: Primary | ICD-10-CM

## 2022-12-22 DIAGNOSIS — Q78.0 OSTEOGENESIS IMPERFECTA: ICD-10-CM

## 2022-12-22 PROCEDURE — 76815 OB US LIMITED FETUS(S): CPT

## 2022-12-22 PROCEDURE — 76817 TRANSVAGINAL US OBSTETRIC: CPT

## 2022-12-22 PROCEDURE — 99213 OFFICE O/P EST LOW 20 MIN: CPT | Performed by: OBSTETRICS & GYNECOLOGY

## 2022-12-22 PROCEDURE — 76815 OB US LIMITED FETUS(S): CPT | Performed by: OBSTETRICS & GYNECOLOGY

## 2022-12-22 PROCEDURE — 76817 TRANSVAGINAL US OBSTETRIC: CPT | Performed by: OBSTETRICS & GYNECOLOGY

## 2022-12-22 NOTE — PROGRESS NOTES
MATERNAL FETAL MEDICINE F/U Note    Dear Dr Kim Judge MD:    Thank you for your kind referral of Gianluca Leyva.  As you know, she is a 22 y.o.   at 18+ 1/7 weeks gestation (Estimated Date of Delivery: 23). This is a follow-up.     Her antepartum course is complicated by:  Maternal Osteogenesis imperfecta Type 1      HPI: Today, she denies headache, blurry vision, RUQ pain. No vaginal bleeding, vaginal leakage, no contractions.     Review of History:  Past Medical History:   Diagnosis Date   • Osteogenesis imperfecta      Past Surgical History:   Procedure Laterality Date   • ELBOW ARTHROPLASTY     • KNEE ARTHROPLASTY     • MUSCLE REPAIR      shoulder   • SHOULDER ARTHROSCOPY         OB Hx:    Social History     Socioeconomic History   • Marital status: Single   Tobacco Use   • Smoking status: Never   • Smokeless tobacco: Never   Vaping Use   • Vaping Use: Never used   Substance and Sexual Activity   • Alcohol use: Never   • Drug use: Never   • Sexual activity: Yes     Partners: Male     Birth control/protection: None     Family History   Problem Relation Age of Onset   • No Known Problems Father    • Osteogenesis imperfecta Mother    • Osteogenesis imperfecta Brother    • Osteogenesis imperfecta Brother    • Breast cancer Neg Hx    • Ovarian cancer Neg Hx    • Uterine cancer Neg Hx    • Colon cancer Neg Hx       No Known Allergies   Current Outpatient Medications on File Prior to Visit   Medication Sig Dispense Refill   • Prenatal MV & Min w/FA-DHA (PRENATAL ADULT GUMMY/DHA/FA PO) Take 1 tablet by mouth Daily.     • vitamin D (ERGOCALCIFEROL) 1.25 MG (58585 UT) capsule capsule Take 1 capsule by mouth 1 (One) Time Per Week. 12 capsule 3   • valACYclovir (VALTREX) 1000 MG tablet TAKE 2 TABLETS BY MOUTH 2 TIMES EVERY DAY FOR 1 DAY       No current facility-administered medications on file prior to visit.        Past obstetric, gynecological, medical, surgical, family and social history  reviewed.  Relevant lab work and imaging reviewed.    Review of systems  Constitutional:  denies fever, chills, malaise.   ENT/Mouth:  denies sore throat, tinnitis  Eyes: denies vision changes/pain  CV:  denies chest pain  Respiratory:  denies cough/SOB  GI:  denies N/V, diarrhea, abdominal pain.    :   denies dysuria  Skin:  denies lesions or pruritis   Neuro:  denies weakness, focal neurologic symptoms    Vitals:    22 1605   BP: 104/50   BP Location: Right arm   Patient Position: Sitting   Pulse: 69   Temp: 98.4 °F (36.9 °C)   TempSrc: Temporal   Weight: 64 kg (141 lb)       PHYSICAL EXAM   GENERAL: Not in acute distress, AAOx3, pleasant  CARDIO: regular rate and rhythm  PULM: symmetric chest rise, speaking in complete sentences without difficulty  NEURO: awake, alert and oriented to person, place, and time  ABDOMINAL: No fundal tenderness, no rebound or guarding, gravid  EXTREMITIES: no bilateral lower extremity edema/tenderness  SKIN: Warm, well-perfused      ULTRASOUND   Please view full ultrasound note on Imaging tab in ViewPoint.    Cephalic Presentation   Posterior placenta previa today (but 18 weeks)  MVP 4.9 cm, which is normal.   Limited anatomy appears normal on today's scan.  Cervix measures 3.4 cm.      ASSESSMENT/COUNSELIN y.o.   At 18+ 1/7 weeks gestation (Estimated Date of Delivery: 23)    -Pregnancy  [ X ] stable  [   ] improving [  ] worsening    There are no diagnoses linked to this encounter.     1. Osteogenesis imperfecta (Primary)  -     [facility]; Future     Osteogenesis  The patient and many of her family members have OI Type 1.  She has not had a fracture in many years--since she was a child.  Her mother has OI and had all vaginal deliveries without issues.   I discussed this with the patient and relayed that ultimately delivery would be a risk/benefit discussion between she and her primary OB.       We will do cervical length screening given  her altered collagen cross-linking.  She can have these either at the [facility] office or primary OB.     I would do CL screening every 2 weeks unless her cervix becomes <3 cm, then weekly for between 2.5-3 cm.       Summary of Plan  -[facility] testing was canceled  -Considering the low lying placenta, advised pelvic rest for the pt at this time  -Follow up for cervical length screening in 2 weeks     Follow-up: 2 weeks CL around 20 wks  already scheduled      Thank you for the consult and opportunity to care for this patient.  Please feel free to reach out with any questions or concerns.      I spent 15 minutes caring for this patient on this date of service. This time includes time spent by me in the following activities: preparing for the visit, reviewing tests, obtaining and/or reviewing a separately obtained history, performing a medically appropriate examination and/or evaluation, counseling and educating the patient/family/caregiver and independently interpreting results and communicating that information with the patient/family/caregiver with greater than 50% spent in counseling and coordination of care.     YASMEEN Damon, CRISTINA-BC  Maternal Fetal Medicine-[facility]  Office: [phone]  [email or web address]

## 2022-12-23 ENCOUNTER — APPOINTMENT (OUTPATIENT)
Dept: ULTRASOUND IMAGING | Facility: HOSPITAL | Age: 22
End: 2022-12-23

## 2022-12-27 ENCOUNTER — ROUTINE PRENATAL (OUTPATIENT)
Dept: OBSTETRICS AND GYNECOLOGY | Age: 22
End: 2022-12-27

## 2022-12-27 ENCOUNTER — TELEPHONE (OUTPATIENT)
Dept: OBSTETRICS AND GYNECOLOGY | Age: 22
End: 2022-12-27

## 2022-12-27 VITALS — BODY MASS INDEX: 23.4 KG/M2 | DIASTOLIC BLOOD PRESSURE: 58 MMHG | WEIGHT: 140.6 LBS | SYSTOLIC BLOOD PRESSURE: 104 MMHG

## 2022-12-27 DIAGNOSIS — Z13.89 SCREENING FOR HEMATURIA OR PROTEINURIA: ICD-10-CM

## 2022-12-27 DIAGNOSIS — Z3A.18 18 WEEKS GESTATION OF PREGNANCY: Primary | ICD-10-CM

## 2022-12-27 DIAGNOSIS — O44.00 PLACENTA PREVIA ANTEPARTUM: ICD-10-CM

## 2022-12-27 DIAGNOSIS — E55.9 VITAMIN D DEFICIENCY: ICD-10-CM

## 2022-12-27 DIAGNOSIS — Q78.0 OSTEOGENESIS IMPERFECTA TYPE I: ICD-10-CM

## 2022-12-27 DIAGNOSIS — Z14.8 CARRIER OF SPINAL MUSCULAR ATROPHY: ICD-10-CM

## 2022-12-27 DIAGNOSIS — Z82.79 FAMILY HISTORY OF OSTEOGENESIS IMPERFECTA: ICD-10-CM

## 2022-12-27 PROCEDURE — 0502F SUBSEQUENT PRENATAL CARE: CPT | Performed by: STUDENT IN AN ORGANIZED HEALTH CARE EDUCATION/TRAINING PROGRAM

## 2022-12-27 NOTE — PROGRESS NOTES
Gianluca Leyva, a 22 y.o.  at 18w6d, presents for OB follow-up.  She is doing well today.  Denies loss of fluid, vaginal bleeding, and contractions.  Endorses fetal movements.    Objective  BP Readings from Last 3 Encounters:   22 104/58   22 104/50   22 136/73      Wt Readings from Last 3 Encounters:   22 63.8 kg (140 lb 9.6 oz)   22 64 kg (141 lb)   22 62.5 kg (137 lb 12.8 oz)      Total weight gain this pregnancy: 1.633 kg (3 lb 9.6 oz)    General: Awake, alert, no apparent distress  Respiratory: No increased work of breathing  Abdomen: Fundus soft and nontender  Extremity: Nontender, no edema    A/P  Diagnoses and all orders for this visit:    1. 18 weeks gestation of pregnancy (Primary)  Overview:  -PNL: Rh pos, infectious testing normal  -Pap: n/a  -Genetics: cfDNA neg, pos SMA carrier - see below, msAFP today, anatomy Us normal with [facility]  -Imm: RI, mary unk (unsure hx, draw titer with next bld draw), tdap > 27wga, s/p covid vaccine, rec'd booster, s/p flu shot  -Contraception: discuss at future visit  -Birthplan: discuss at future visit  -Care coordination: accepts blood transfusions, no latex allergy, call schedule reviewed      Orders:  -     POC Urinalysis Dipstick  -     Alpha Fetoprotein, Maternal  -     Vitamin D 25 Hydroxy    2. Screening for hematuria or proteinuria  -     POC Urinalysis Dipstick    3. Osteogenesis imperfecta type I  Overview:  -Patient, mother, and her two brothers all with OI, h/o multiple fractures  -S/p [facility] consult - recs for vaginal delivery unless other obstetric indication for  and CL screening at [facility] office  -[facility] discussed prenatal dx of OI with CVS vs amnio, patient declined CVS but will consider amnio      4. Family history of osteogenesis imperfecta  Overview:  Patient's mother, three other siblings, and maternal grandmother      5. Vitamin D deficiency  Overview:  -vit D 25  -moderately compliant with vit D 75560 IU weekly    -recheck vit D level today    Orders:  -     Vitamin D 25 Hydroxy    6. Carrier of spinal muscular atrophy  Overview:  -screen pos for carrier status, FOB testing pending      7. Placenta previa antepartum  Overview:  -Noted on 16 wk Us  -No VB  -discussed pelvic rest          SAB precautions reviewed  Return in about 4 weeks (around 1/24/2023) for Next f/u.    Kim Sloan Judge MD

## 2022-12-29 LAB
25(OH)D3+25(OH)D2 SERPL-MCNC: 19.1 NG/ML (ref 30–100)
AFP INTERP SERPL-IMP: NORMAL
AFP INTERP SERPL-IMP: NORMAL
AFP MOM SERPL: 1.22
AFP SERPL-MCNC: 64 NG/ML
AGE AT DELIVERY: 20.8 YR
GA METHOD: NORMAL
GA: 18.7 WEEKS
IDDM PATIENT QL: NO
LABORATORY COMMENT REPORT: NORMAL
MULTIPLE PREGNANCY: NO
NEURAL TUBE DEFECT RISK FETUS: 6110 %
RESULT: NORMAL

## 2023-01-03 ENCOUNTER — PATIENT MESSAGE (OUTPATIENT)
Dept: OBSTETRICS AND GYNECOLOGY | Age: 23
End: 2023-01-03
Payer: COMMERCIAL

## 2023-01-05 NOTE — PROGRESS NOTES
MATERNAL FETAL MEDICINE F/U Note    Dear Dr Kim Judge MD:    Thank you for your kind referral of Gianluca Leyva.  As you know, she is a 22 y.o.    at   20w1d  (Estimated Date of Delivery: 23). This is a follow-up.      Her antepartum course is complicated by:  Maternal Osteogenesis imperfecta Type 1      HPI: Today, she denies headache, blurry vision, RUQ pain. No vaginal bleeding, vaginal leakage, no contractions and endorses positive fetal movement.      Review of History:  Past Medical History:   Diagnosis Date   • Osteogenesis imperfecta      Past Surgical History:   Procedure Laterality Date   • ELBOW ARTHROPLASTY     • KNEE ARTHROPLASTY     • MUSCLE REPAIR      shoulder   • SHOULDER ARTHROSCOPY         OB Hx:    Social History     Socioeconomic History   • Marital status: Single   Tobacco Use   • Smoking status: Never   • Smokeless tobacco: Never   Vaping Use   • Vaping Use: Never used   Substance and Sexual Activity   • Alcohol use: Never   • Drug use: Never   • Sexual activity: Yes     Partners: Male     Birth control/protection: None     Family History   Problem Relation Age of Onset   • No Known Problems Father    • Osteogenesis imperfecta Mother    • Osteogenesis imperfecta Brother    • Osteogenesis imperfecta Brother    • Breast cancer Neg Hx    • Ovarian cancer Neg Hx    • Uterine cancer Neg Hx    • Colon cancer Neg Hx       No Known Allergies   Current Outpatient Medications on File Prior to Visit   Medication Sig Dispense Refill   • Prenatal MV & Min w/FA-DHA (PRENATAL ADULT GUMMY/DHA/FA PO) Take 1 tablet by mouth Daily.     • valACYclovir (VALTREX) 1000 MG tablet TAKE 2 TABLETS BY MOUTH 2 TIMES EVERY DAY FOR 1 DAY     • vitamin D (ERGOCALCIFEROL) 1.25 MG (59055 UT) capsule capsule Take 1 capsule by mouth 1 (One) Time Per Week. 12 capsule 3     No current facility-administered medications on file prior to visit.        Past obstetric, gynecological, medical, surgical, family and  social history reviewed.  Relevant lab work and imaging reviewed.    Review of systems  Constitutional:  denies fever, chills, malaise.   ENT/Mouth:  denies sore throat, tinnitis  Eyes: denies vision changes/pain  CV:  denies chest pain  Respiratory:  denies cough/SOB  GI:  denies N/V, diarrhea, abdominal pain.    :   denies dysuria  Skin:  denies lesions or pruritis   Neuro:  denies weakness, focal neurologic symptoms    Vitals:    23 1323   BP: 112/56   BP Location: Right arm   Patient Position: Sitting   Pulse: 83   Temp: 99.1 °F (37.3 °C)   TempSrc: Temporal   Weight: 64.9 kg (143 lb)       PHYSICAL EXAM   GENERAL: Not in acute distress, AAOx3, pleasant  CARDIO: regular rate and rhythm  PULM: symmetric chest rise, speaking in complete sentences without difficulty  NEURO: awake, alert and oriented to person, place, and time  ABDOMINAL: No fundal tenderness, no rebound or guarding, gravid  EXTREMITIES: no bilateral lower extremity edema/tenderness  SKIN: Warm, well-perfused      ULTRASOUND   Please view full ultrasound note on Imaging tab in ViewPoint.  Reviewed and signed by Dr. Malissa White    ASSESSMENT/COUNSELIN y.o.  at  20w1d  (Estimated Date of Delivery: 23)    -Pregnancy  [ X ] stable  [   ] improving [  ] worsening    Diagnoses and all orders for this visit:    1. Osteogenesis imperfecta type I (Primary)  Overview:  -Patient, mother, and her two brothers all with OI, h/o multiple fractures  -S/p [facility] consult - recs for vaginal delivery unless other obstetric indication for  and CL screening at [facility] office  -[facility] discussed prenatal dx of OI with CVS vs amnio, patient declined CVS but will consider amnio      2. Family history of osteogenesis imperfecta  Overview:  Patient's mother, three other siblings, and maternal grandmother      3. Carrier of spinal muscular atrophy  Overview:  -screen pos for carrier status, FOB testing pending      4. Placenta previa  antepartum  Overview:  -Noted on 16 wk Us  -No VB  -discussed pelvic rest                Osteogenesis  The patient and many family members have a significant history of OI Type 1.  She has not had a fracture in many years--since she was a child.  Her mother has OI and had all vaginal deliveries without issues.      Discussed the fact that a dx of OI does not mean she will have to have a  Delivery.  Pt could still be able to have a vaginal birth.      We discussed the results of her u/s today in which the placenta has moved away from the cervix and so that issue has resolved.  Her cervical length is also within normal limits today.       We will do cervical length screening given her altered collagen cross-linking.  She can have these either at the [facility] office or primary OB.     I would do CL screening every 2 weeks unless her cervix becomes <3 cm, then weekly for between 2.5-3 cm.      All questions answered.       Summary of Plan  -Follow up for cervical length screening in 2 weeks     Follow-up: 2 weeks CL      Thank you for the consult and opportunity to care for this patient.  Please feel free to reach out with any questions or concerns.          YASMEEN Damon, CRISTINA-BC  Maternal Fetal Medicine-[facility]  Office: [phone]  [email or web address]

## 2023-01-06 ENCOUNTER — HOSPITAL ENCOUNTER (OUTPATIENT)
Dept: ULTRASOUND IMAGING | Facility: HOSPITAL | Age: 23
Discharge: HOME | End: 2023-01-06
Admitting: OBSTETRICS & GYNECOLOGY
Payer: COMMERCIAL

## 2023-01-06 ENCOUNTER — TRANSCRIBE ORDERS (OUTPATIENT)
Dept: ULTRASOUND IMAGING | Facility: HOSPITAL | Age: 23
End: 2023-01-06
Payer: COMMERCIAL

## 2023-01-06 ENCOUNTER — OFFICE VISIT (OUTPATIENT)
Dept: OBSTETRICS AND GYNECOLOGY | Facility: CLINIC | Age: 23
End: 2023-01-06
Payer: COMMERCIAL

## 2023-01-06 VITALS
WEIGHT: 143 LBS | TEMPERATURE: 99.1 F | DIASTOLIC BLOOD PRESSURE: 56 MMHG | BODY MASS INDEX: 23.8 KG/M2 | SYSTOLIC BLOOD PRESSURE: 112 MMHG | HEART RATE: 83 BPM

## 2023-01-06 DIAGNOSIS — Q78.0 OSTEOGENESIS IMPERFECTA: ICD-10-CM

## 2023-01-06 DIAGNOSIS — O44.00 PLACENTA PREVIA ANTEPARTUM: ICD-10-CM

## 2023-01-06 DIAGNOSIS — Z14.8 CARRIER OF SPINAL MUSCULAR ATROPHY: ICD-10-CM

## 2023-01-06 DIAGNOSIS — Q78.0 OSTEOGENESIS IMPERFECTA TYPE I: Primary | ICD-10-CM

## 2023-01-06 DIAGNOSIS — Z82.79 FAMILY HISTORY OF OSTEOGENESIS IMPERFECTA: ICD-10-CM

## 2023-01-06 PROCEDURE — 76817 TRANSVAGINAL US OBSTETRIC: CPT

## 2023-01-06 PROCEDURE — 76811 OB US DETAILED SNGL FETUS: CPT | Performed by: OBSTETRICS & GYNECOLOGY

## 2023-01-06 PROCEDURE — 76817 TRANSVAGINAL US OBSTETRIC: CPT | Performed by: OBSTETRICS & GYNECOLOGY

## 2023-01-06 PROCEDURE — 99213 OFFICE O/P EST LOW 20 MIN: CPT | Performed by: NURSE PRACTITIONER

## 2023-01-06 PROCEDURE — 76811 OB US DETAILED SNGL FETUS: CPT

## 2023-01-06 NOTE — LETTER
2023     Kim Judge MD  [address] [address]    Patient: Gianluca Leyva   YOB: 2000   Date of Visit: 2023     Dear Dr. Nu MD:    Thank you for referring Gianluca Leyva to me for evaluation. Below are the relevant portions of my assessment and plan of care.    If you have questions, please do not hesitate to call me. I look forward to following Gianluca along with you.         Sincerely,        YASMEEN Arceo        CC: No Recipients    MATERNAL FETAL MEDICINE F/U Note    Dear Dr Kim Judge MD:    Thank you for your kind referral of Gianluca Leyva.  As you know, she is a 22 y.o.    at   20w1d  (Estimated Date of Delivery: 23). This is a follow-up.      Her antepartum course is complicated by:  Maternal Osteogenesis imperfecta Type 1      HPI: Today, she denies headache, blurry vision, RUQ pain. No vaginal bleeding, vaginal leakage, no contractions and endorses positive fetal movement.      Review of History:  Past Medical History:   Diagnosis Date   • Osteogenesis imperfecta      Past Surgical History:   Procedure Laterality Date   • ELBOW ARTHROPLASTY     • KNEE ARTHROPLASTY     • MUSCLE REPAIR      shoulder   • SHOULDER ARTHROSCOPY         OB Hx:    Social History     Socioeconomic History   • Marital status: Single   Tobacco Use   • Smoking status: Never   • Smokeless tobacco: Never   Vaping Use   • Vaping Use: Never used   Substance and Sexual Activity   • Alcohol use: Never   • Drug use: Never   • Sexual activity: Yes     Partners: Male     Birth control/protection: None     Family History   Problem Relation Age of Onset   • No Known Problems Father    • Osteogenesis imperfecta Mother    • Osteogenesis imperfecta Brother    • Osteogenesis imperfecta Brother    • Breast cancer Neg Hx    • Ovarian cancer Neg Hx    • Uterine cancer Neg Hx    • Colon cancer Neg Hx       No Known Allergies   Current Outpatient Medications on File  Prior to Visit   Medication Sig Dispense Refill   • Prenatal MV & Min w/FA-DHA (PRENATAL ADULT GUMMY/DHA/FA PO) Take 1 tablet by mouth Daily.     • valACYclovir (VALTREX) 1000 MG tablet TAKE 2 TABLETS BY MOUTH 2 TIMES EVERY DAY FOR 1 DAY     • vitamin D (ERGOCALCIFEROL) 1.25 MG (36678 UT) capsule capsule Take 1 capsule by mouth 1 (One) Time Per Week. 12 capsule 3     No current facility-administered medications on file prior to visit.        Past obstetric, gynecological, medical, surgical, family and social history reviewed.  Relevant lab work and imaging reviewed.    Review of systems  Constitutional:  denies fever, chills, malaise.   ENT/Mouth:  denies sore throat, tinnitis  Eyes: denies vision changes/pain  CV:  denies chest pain  Respiratory:  denies cough/SOB  GI:  denies N/V, diarrhea, abdominal pain.    :   denies dysuria  Skin:  denies lesions or pruritis   Neuro:  denies weakness, focal neurologic symptoms    Vitals:    23 1323   BP: 112/56   BP Location: Right arm   Patient Position: Sitting   Pulse: 83   Temp: 99.1 °F (37.3 °C)   TempSrc: Temporal   Weight: 64.9 kg (143 lb)       PHYSICAL EXAM   GENERAL: Not in acute distress, AAOx3, pleasant  CARDIO: regular rate and rhythm  PULM: symmetric chest rise, speaking in complete sentences without difficulty  NEURO: awake, alert and oriented to person, place, and time  ABDOMINAL: No fundal tenderness, no rebound or guarding, gravid  EXTREMITIES: no bilateral lower extremity edema/tenderness  SKIN: Warm, well-perfused      ULTRASOUND   Please view full ultrasound note on Imaging tab in ViewPoint.  Reviewed and signed by Dr. Malissa White    ASSESSMENT/COUNSELIN y.o.  at  20w1d  (Estimated Date of Delivery: 23)    -Pregnancy  [ X ] stable  [   ] improving [  ] worsening    Diagnoses and all orders for this visit:    1. Osteogenesis imperfecta type I (Primary)  Overview:  -Patient, mother, and her two brothers all with OI, h/o multiple  fractures  -S/p [facility] consult - recs for vaginal delivery unless other obstetric indication for  and CL screening at [facility] office  -[facility] discussed prenatal dx of OI with CVS vs amnio, patient declined CVS but will consider amnio      2. Family history of osteogenesis imperfecta  Overview:  Patient's mother, three other siblings, and maternal grandmother      3. Carrier of spinal muscular atrophy  Overview:  -screen pos for carrier status, FOB testing pending      4. Placenta previa antepartum  Overview:  -Noted on 16 wk Us  -No VB  -discussed pelvic rest                Osteogenesis  The patient and many family members have a significant history of OI Type 1.  She has not had a fracture in many years--since she was a child.  Her mother has OI and had all vaginal deliveries without issues.      Discussed the fact that a dx of OI does not mean she will have to have a  Delivery.  Pt could still be able to have a vaginal birth.      We discussed the results of her u/s today in which the placenta has moved away from the cervix and so that issue has resolved.  Her cervical length is also within normal limits today.       We will do cervical length screening given her altered collagen cross-linking.  She can have these either at the [facility] office or primary OB.     I would do CL screening every 2 weeks unless her cervix becomes <3 cm, then weekly for between 2.5-3 cm.      All questions answered.       Summary of Plan  -Follow up for cervical length screening in 2 weeks     Follow-up: 2 weeks CL      Thank you for the consult and opportunity to care for this patient.  Please feel free to reach out with any questions or concerns.          YASMEEN Damon, CRISTINA-BC  Maternal Fetal Medicine-[facility]  Office: [phone]  [email or web address]

## 2023-01-06 NOTE — PROGRESS NOTES
Pt reports that she is doing well and denies vaginal bleeding, cramping, contractions or LOF at this time. Reports active fetal movement. Denies HA, visual changes or epigastric pain. Denies any additional complaints at time of appointment. Next OB appointment scheduled for 1/26.    Vitals:    01/06/23 1323   BP: 112/56   Pulse: 83   Temp: 99.1 °F (37.3 °C)

## 2023-01-06 NOTE — LETTER
2023     Kim Judge MD  [address] [facility] [address]    Patient: Gianluca Leyva   YOB: 2000   Date of Visit: 2023       Dear Kim Judge MD,    Thank you for referring Gianluca Leyva to me for evaluation. Below is a copy of my consult note.    If you have questions, please do not hesitate to call me. I look forward to following Gianluca along with you.         Sincerely,        YASMEEN Arceo        CC: No Recipients    MATERNAL FETAL MEDICINE F/U Note    Dear Dr Kim Judge MD:    Thank you for your kind referral of Gianluca Leyva.  As you know, she is a 22 y.o.    at   20w1d  (Estimated Date of Delivery: 23). This is a follow-up.      Her antepartum course is complicated by:  Maternal Osteogenesis imperfecta Type 1    Aneuploidy Screening:  Carrier Screening:    HPI: Today, she denies headache, blurry vision, RUQ pain. No vaginal bleeding, vaginal leakage, no contractions. ***    Review of History:  Past Medical History:   Diagnosis Date   • Osteogenesis imperfecta      Past Surgical History:   Procedure Laterality Date   • ELBOW ARTHROPLASTY     • KNEE ARTHROPLASTY     • MUSCLE REPAIR      shoulder   • SHOULDER ARTHROSCOPY         OB Hx:    Social History     Socioeconomic History   • Marital status: Single   Tobacco Use   • Smoking status: Never   • Smokeless tobacco: Never   Vaping Use   • Vaping Use: Never used   Substance and Sexual Activity   • Alcohol use: Never   • Drug use: Never   • Sexual activity: Yes     Partners: Male     Birth control/protection: None     Family History   Problem Relation Age of Onset   • No Known Problems Father    • Osteogenesis imperfecta Mother    • Osteogenesis imperfecta Brother    • Osteogenesis imperfecta Brother    • Breast cancer Neg Hx    • Ovarian cancer Neg Hx    • Uterine cancer Neg Hx    • Colon cancer Neg Hx       No Known Allergies   Current Outpatient Medications on File Prior to Visit    Medication Sig Dispense Refill   • Prenatal MV & Min w/FA-DHA (PRENATAL ADULT GUMMY/DHA/FA PO) Take 1 tablet by mouth Daily.     • valACYclovir (VALTREX) 1000 MG tablet TAKE 2 TABLETS BY MOUTH 2 TIMES EVERY DAY FOR 1 DAY     • vitamin D (ERGOCALCIFEROL) 1.25 MG (36806 UT) capsule capsule Take 1 capsule by mouth 1 (One) Time Per Week. 12 capsule 3     No current facility-administered medications on file prior to visit.        Past obstetric, gynecological, medical, surgical, family and social history reviewed.  Relevant lab work and imaging reviewed.    Review of systems  Constitutional:  denies fever, chills, malaise.   ENT/Mouth:  denies sore throat, tinnitis  Eyes: denies vision changes/pain  CV:  denies chest pain  Respiratory:  denies cough/SOB  GI:  denies N/V, diarrhea, abdominal pain.    :   denies dysuria  Skin:  denies lesions or pruritis   Neuro:  denies weakness, focal neurologic symptoms    There were no vitals filed for this visit.    PHYSICAL EXAM   GENERAL: Not in acute distress, AAOx3, pleasant  CARDIO: regular rate and rhythm  PULM: symmetric chest rise, speaking in complete sentences without difficulty  NEURO: awake, alert and oriented to person, place, and time  ABDOMINAL: No fundal tenderness, no rebound or guarding, gravid  EXTREMITIES: no bilateral lower extremity edema/tenderness  SKIN: Warm, well-perfused      ULTRASOUND   Please view full ultrasound note on Imaging tab in ViewPoint.  ***    ASSESSMENT/COUNSELIN y.o.  at  20w1d  (Estimated Date of Delivery: 23)    -Pregnancy  [ X ] stable  [   ] improving [  ] worsening    Diagnoses and all orders for this visit:    1. Osteogenesis imperfecta type I (Primary)  Overview:  -Patient, mother, and her two brothers all with OI, h/o multiple fractures  -S/p [facility] consult - recs for vaginal delivery unless other obstetric indication for  and CL screening at [facility] office  -[facility] discussed prenatal dx of OI with CVS vs amnio,  patient declined CVS but will consider amnio      2. Family history of osteogenesis imperfecta  Overview:  Patient's mother, three other siblings, and maternal grandmother      3. Carrier of spinal muscular atrophy  Overview:  -screen pos for carrier status, FOB testing pending      4. Placenta previa antepartum  Overview:  -Noted on 16 wk Us  -No VB  -discussed pelvic rest             1. Osteogenesis imperfecta (Primary)  -     [facility]; Future  -     [facility]; Future           Osteogenesis  The patient and many of her family members have OI Type 1.  She has not had a fracture in many years--since she was a child.  Her mother has OI and had all vaginal deliveries without issues.   90% of patients with OI have pathogenic (disease-causing) DNA changes in the COL1A1 or the COL1A2 genes. Changes in COL1A1  and COL1A2 predominantly cause the common OI types I - IV. Many individuals with OI type I or type IV inherit the variant from an affected parent in an autosomal dominant manner.      Previously, there was suggested theory that  delivery was safer and less invasive than vaginal delivery for a fetus diagnosed with OI although there was no evidence to support this theory. A recent, large-cohort analysis of 540 individuals showed that delivery by caesarean section is not associated with decreased at-birth fracture rate. Comparisons of patients with diagnoses of OI types I, II, IV showed that at-birth fractures did not occur at a higher rate between vaginal and caesarean delivery.   They found that 92.6% of type 3 OI, 50.7% of type 4 OI, and 17.2% of type 1 OI cases had at-birth fractures regardless of delivery and that babies that showed prenatal fractures were more likely to be delivered by  but this did not decrease fracture development at birth. It should be noted that approximately 40% of OI type 3 cases were breech presentations as compared to the  general population (5%). Therefore caesarean section should be performed for usual obstetric indications but not solely for the prevention of new fractures (PMID: 54651103).     References:   Lisa S, Noel M, Edil D, Elsa D, Susie JR, Terry RD, Micth PA, Nara MB, Teddy T, Sandeep J, Stef M, Viviana PH, Nathaniel M, Ruy M, Leisa FH, Daria F, Yuniel VR, Matthew PRETTY; Members of the BBD Consortium, Parth YANCEY.  delivery is not associated with decreased at-birth fracture rates in osteogenesis imperfecta. Mariah Med. 2016 Luis;18(6):570-6. doi: 10.1038/gim.2015.131. Epub 2015 Oct 1. PMID: 14473616; PMCID: [id number].     I discussed this with the patient and relayed that ultimately delivery would be a risk/benefit discussion between she and her primary OB.       I also discussed Vistara testing today as a non-invasive screening test for osteogenesis imperfecta types I-IV.  We reviewed that this test has the possibility of false negatives and false positives and is therefore not a diagnostic test. She is interested in this and we did order it today.  She had it previously but there was an issue with sample collection at [facility]--I apologized to the patient and we called [facility] and are having them follow the sample and make sure it is done correctly this time.     We will do cervical length screening given her altered collagen cross-linking.  She can have these either at the [facility] office or primary OB.     I would do CL screening every 2 weeks unless her cervix becomes <3 cm, then weekly for between 2.5-3 cm.       Summary of Plan  -Vistara testing today  -Follow up for cervical length screening in 2 weeks     Follow-up: 2 weeks CL                  Summary of Plan  -Serial growth ultrasounds every 4 - 6 weeks (by primary OB)   -Starting at 28 - 32 weeks: Weekly fetal  surveillance until delivery   -Starting at 28 weeks: Fetal movement instructions given continue daily until delivery; instructed to  report to labor and delivery if cannot achieve more than 10 kicks in one hour or if she perceives a decrease in fetal movement    Follow-up: No follow up with MFM scheduled, but I am happy to see for follow up at request of primary obstetrician    Thank you for the consult and opportunity to care for this patient.  Please feel free to reach out with any questions or concerns.      I spent *** minutes caring for this patient on this date of service. This time includes time spent by me in the following activities: preparing for the visit, reviewing tests, obtaining and/or reviewing a separately obtained history, performing a medically appropriate examination and/or evaluation, counseling and educating the patient/family/caregiver and independently interpreting results and communicating that information with the patient/family/caregiver with greater than 50% spent in counseling and coordination of care.     YASMEEN Damon, CRISTINA-BC  Maternal Fetal Medicine-[facility]  Office: [phone]  [email or web address]    Pt reports that she is doing well and denies vaginal bleeding, cramping, contractions or LOF at this time. Reports active fetal movement. Denies HA, visual changes or epigastric pain. Denies any additional complaints at time of appointment. Next OB appointment scheduled for 1/26.    Vitals:    01/06/23 1323   BP: 112/56   Pulse: 83   Temp: 99.1 °F (37.3 °C)

## 2023-01-09 NOTE — TELEPHONE ENCOUNTER
From: Gianluca Leyva  To: Kim Judge  Sent: 1/3/2023 9:11 AM EST  Subject: Question regarding VITAMIN D,25-HYDROXY    I haven’t been the best each week due to me not being able to swallow pills having to dissolve them & then take it , it has been a goal trying to be more consistent.

## 2023-01-10 ENCOUNTER — TELEPHONE (OUTPATIENT)
Dept: OBSTETRICS AND GYNECOLOGY | Facility: CLINIC | Age: 23
End: 2023-01-10
Payer: COMMERCIAL

## 2023-01-10 DIAGNOSIS — Z3A.20 20 WEEKS GESTATION OF PREGNANCY: ICD-10-CM

## 2023-01-10 DIAGNOSIS — E55.9 VITAMIN D DEFICIENCY: Primary | ICD-10-CM

## 2023-01-10 DIAGNOSIS — Q78.0 OSTEOGENESIS IMPERFECTA: ICD-10-CM

## 2023-01-10 NOTE — TELEPHONE ENCOUNTER
Called pt to let her know that her Vit D had decreased and that I was going to refer her to [facility] for review of her case.  She agreed.  Referral sent.

## 2023-01-18 NOTE — PROGRESS NOTES
MATERNAL FETAL MEDICINE Consult Note    Dear Dr Kim Judge MD:    Thank you for your kind referral of Gianluca Leyva.  As you know, she is a 22 y.o. G1  P  at  22 2 /7 weeks gestation (Estimated Date of Delivery: 5/24/23). This is a consult.      Her antepartum course is complicated by:  Maternal Osteogenesis imperfecta Type 1  Vitamin D def refractory to treatment    Aneuploidy Screening: low risk      HPI: Today, she denies headache, blurry vision, RUQ pain. No vaginal bleeding, no contractions.     Review of History:  Past Medical History:   Diagnosis Date   • Osteogenesis imperfecta      Past Surgical History:   Procedure Laterality Date   • ELBOW ARTHROPLASTY     • KNEE ARTHROPLASTY     • MUSCLE REPAIR      shoulder   • SHOULDER ARTHROSCOPY           Social History     Socioeconomic History   • Marital status: Single   Tobacco Use   • Smoking status: Never   • Smokeless tobacco: Never   Vaping Use   • Vaping Use: Never used   Substance and Sexual Activity   • Alcohol use: Never   • Drug use: Never   • Sexual activity: Yes     Partners: Male     Birth control/protection: None     Family History   Problem Relation Age of Onset   • No Known Problems Father    • Osteogenesis imperfecta Mother    • Osteogenesis imperfecta Brother    • Osteogenesis imperfecta Brother    • Breast cancer Neg Hx    • Ovarian cancer Neg Hx    • Uterine cancer Neg Hx    • Colon cancer Neg Hx       No Known Allergies   Current Outpatient Medications on File Prior to Visit   Medication Sig Dispense Refill   • cholecalciferol (VITAMIN D3) 250 MCG (31896 UT) capsule Take 1 capsule by mouth Daily. 90 capsule 3   • Prenatal MV & Min w/FA-DHA (PRENATAL ADULT GUMMY/DHA/FA PO) Take 1 tablet by mouth Daily.     • valACYclovir (VALTREX) 1000 MG tablet TAKE 2 TABLETS BY MOUTH 2 TIMES EVERY DAY FOR 1 DAY     • vitamin D (ERGOCALCIFEROL) 1.25 MG (51773 UT) capsule capsule Take 1 capsule by mouth 1 (One) Time Per Week. 12 capsule 3     No  current facility-administered medications on file prior to visit.        Past obstetric, gynecological, medical, surgical, family and social history reviewed.  Relevant lab work and imaging reviewed.    Review of systems  Constitutional:  denies fever, chills, malaise.   ENT/Mouth:  denies sore throat, tinnitis  Eyes: denies vision changes/pain  CV:  denies chest pain  Respiratory:  denies cough/SOB  GI:  denies N/V, diarrhea, abdominal pain.    :   denies dysuria  Skin:  denies lesions or pruritis   Neuro:  denies weakness, focal neurologic symptoms    Vitals:    23 0924   BP: 110/56   BP Location: Right arm   Patient Position: Sitting   Pulse: 71   Temp: 98.6 °F (37 °C)   TempSrc: Temporal   Weight: 66.9 kg (147 lb 6.4 oz)       PHYSICAL EXAM   GENERAL: Not in acute distress, AAOx3, pleasant  CARDIO: regular rate and rhythm  PULM: symmetric chest rise, speaking in complete sentences without difficulty  NEURO: awake, alert and oriented to person, place, and time  ABDOMINAL: No fundal tenderness, no rebound or guarding, gravid  EXTREMITIES: no bilateral lower extremity edema/tenderness  SKIN: Warm, well-perfused      ULTRASOUND   Please view full ultrasound note on Imaging tab in ViewPoint.  Cephalic presentation.  Posterior placenta.  MVP 7.5 cm, which is normal.  Normal limited anatomy.    Cervical length 3.9 cm, which is normal.    ASSESSMENT/COUNSELIN y.o. G1  P  at  22 2 /7 weeks gestation (Estimated Date of Delivery: 23).     -Pregnancy  [ X ] stable  [   ] improving [  ] worsening    Diagnoses and all orders for this visit:    1. Osteogenesis imperfecta (Primary)  -     [facility]; Future    2. Vitamin D deficiency  Overview:  -vit D 25 > 19  -moderately compliant with vit D 71441 IU weekly   -plan for vit D 33141 IU daily to improve compliance  -recheck vit D with GTT    Orders:  -     [facility]; Future    3. Carrier of spinal muscular  atrophy  Overview:  -screen pos for carrier status, FOB testing pending         Osteogenesis  The patient and many of her family members have OI Type 1.  She has not had a fracture in many years--since she was a child.  Her mother has OI and had all vaginal deliveries without issues.   90% of patients with OI have pathogenic (disease-causing) DNA changes in the COL1A1 or the COL1A2 genes. Changes in COL1A1  and COL1A2 predominantly cause the common OI types I - IV. Many individuals with OI type I or type IV inherit the variant from an affected parent in an autosomal dominant manner.     Previously, there was suggested theory that  delivery was safer and less invasive than vaginal delivery for a fetus diagnosed with OI although there was no evidence to support this theory. A recent, large-cohort analysis of 540 individuals showed that delivery by caesarean section is not associated with decreased at-birth fracture rate. Comparisons of patients with diagnoses of OI types I, II, IV showed that at-birth fractures did not occur at a higher rate between vaginal and caesarean delivery.   They found that 92.6% of type 3 OI, 50.7% of type 4 OI, and 17.2% of type 1 OI cases had at-birth fractures regardless of delivery and that babies that showed prenatal fractures were more likely to be delivered by  but this did not decrease fracture development at birth. It should be noted that approximately 40% of OI type 3 cases were breech presentations as compared to the general population (5%). Therefore caesarean section should be performed for usual obstetric indications but not solely for the prevention of new fractures (PMID: 81442031).    References:   Lisa S, Noel M, Edil D, Elsa D, Susie JR, Terry RD, Mitch PA, Nara MB, Teddy T, Sandeep J, Stef M, Viviana PH, Nathaniel M, Ruy M, Leisa FH, Daria F, Yuniel VR, Matthew B; Members of the BBD Consortium, Parth YANCEY.  delivery is not  associated with decreased at-birth fracture rates in osteogenesis imperfecta. Mariah Med. 2016 Luis;18(6):570-6. doi: 10.1038/gim.2015.131. Epub 2015 Oct 1. PMID: 23618663; PMCID: [id number].    I discussed this with the patient and relayed that ultimately delivery would be a risk/benefit discussion between she and her primary OB.      Vistara unfortunately could not be performed due to inability to tell between mom and baby's DNA (and mom has OI).  Patient will do  testing.      We will do cervical length screening given her altered collagen cross-linking.  These have been negative.  Will do 1 more in 1.5 weeks with growth.      Carrier SMA:  Father negative    Vitamin D def refractory to treatment in setting of maternal OI:  Pt to see Endo in February.  Will follow up recs.     Summary of Plan  -1 more CL before 24 weeks.   -Follow up endo recs  -Serial growth exams at primary OB after that  -Consider weekly ANFS for fetus at risk for OI between 32-36 weeks  -Delivery 39 weeks.  CS reserved for usual obstetric indications.     Follow-up: 1.5 wks CL    Thank you for the consult and opportunity to care for this patient.  Please feel free to reach out with any questions or concerns.      I spent 10 minutes caring for this patient on this date of service. This time includes time spent by me in the following activities: preparing for the visit, reviewing tests, obtaining and/or reviewing a separately obtained history, performing a medically appropriate examination and/or evaluation, counseling and educating the patient/family/caregiver and independently interpreting results and communicating that information with the patient/family/caregiver with greater than 50% spent in counseling and coordination of care.     Malissa White MD FACOG  Maternal Fetal Medicine-[facility]  Office: [phone]  [email or web address]

## 2023-01-20 ENCOUNTER — OFFICE VISIT (OUTPATIENT)
Dept: OBSTETRICS AND GYNECOLOGY | Facility: CLINIC | Age: 23
End: 2023-01-20
Payer: COMMERCIAL

## 2023-01-20 ENCOUNTER — HOSPITAL ENCOUNTER (OUTPATIENT)
Dept: ULTRASOUND IMAGING | Facility: HOSPITAL | Age: 23
Discharge: HOME | End: 2023-01-20
Admitting: NURSE PRACTITIONER
Payer: COMMERCIAL

## 2023-01-20 VITALS
DIASTOLIC BLOOD PRESSURE: 56 MMHG | BODY MASS INDEX: 24.53 KG/M2 | HEART RATE: 71 BPM | TEMPERATURE: 98.6 F | WEIGHT: 147.4 LBS | SYSTOLIC BLOOD PRESSURE: 110 MMHG

## 2023-01-20 DIAGNOSIS — Z14.8 CARRIER OF SPINAL MUSCULAR ATROPHY: ICD-10-CM

## 2023-01-20 DIAGNOSIS — E55.9 VITAMIN D DEFICIENCY: ICD-10-CM

## 2023-01-20 DIAGNOSIS — Q78.0 OSTEOGENESIS IMPERFECTA: Primary | ICD-10-CM

## 2023-01-20 DIAGNOSIS — Q78.0 OSTEOGENESIS IMPERFECTA TYPE I: ICD-10-CM

## 2023-01-20 PROCEDURE — 76815 OB US LIMITED FETUS(S): CPT

## 2023-01-20 PROCEDURE — 76817 TRANSVAGINAL US OBSTETRIC: CPT | Performed by: OBSTETRICS & GYNECOLOGY

## 2023-01-20 PROCEDURE — 99212 OFFICE O/P EST SF 10 MIN: CPT | Performed by: OBSTETRICS & GYNECOLOGY

## 2023-01-20 PROCEDURE — 76815 OB US LIMITED FETUS(S): CPT | Performed by: OBSTETRICS & GYNECOLOGY

## 2023-01-20 PROCEDURE — 76817 TRANSVAGINAL US OBSTETRIC: CPT

## 2023-01-20 NOTE — PROGRESS NOTES
Pt reports that she is doing well and denies vaginal bleeding, cramping, contractions or LOF at this time. Reports active fetal movement. Denies HA, visual changes or epigastric pain. Denies any additional complaints at time of appointment. Next OB appointment scheduled for 1/26.    Vitals:    01/20/23 0924   BP: 110/56   Pulse: 71   Temp: 98.6 °F (37 °C)

## 2023-01-20 NOTE — LETTER
January 20, 2023     Kim Judge MD  [address] [facility] [address]    Patient: Gianluca Leyva   YOB: 2000   Date of Visit: 1/20/2023       Dear Kim Judge MD,    Thank you for referring Gianluca Leyva to me for evaluation. Below is a copy of my consult note.    If you have questions, please do not hesitate to call me. I look forward to following Gianluca along with you.         Sincerely,        Malissa White MD        MATERNAL FETAL MEDICINE Consult Note    Dear Dr Kim Judge MD:    Thank you for your kind referral of Gianluca Leyva.  As you know, she is a 22 y.o. G1  P  at  22 2 /7 weeks gestation (Estimated Date of Delivery: 5/24/23). This is a consult.      Her antepartum course is complicated by:  Maternal Osteogenesis imperfecta Type 1  Vitamin D def refractory to treatment    Aneuploidy Screening: low risk      HPI: Today, she denies headache, blurry vision, RUQ pain. No vaginal bleeding, no contractions.     Review of History:  Past Medical History:   Diagnosis Date   • Osteogenesis imperfecta      Past Surgical History:   Procedure Laterality Date   • ELBOW ARTHROPLASTY     • KNEE ARTHROPLASTY     • MUSCLE REPAIR      shoulder   • SHOULDER ARTHROSCOPY           Social History     Socioeconomic History   • Marital status: Single   Tobacco Use   • Smoking status: Never   • Smokeless tobacco: Never   Vaping Use   • Vaping Use: Never used   Substance and Sexual Activity   • Alcohol use: Never   • Drug use: Never   • Sexual activity: Yes     Partners: Male     Birth control/protection: None     Family History   Problem Relation Age of Onset   • No Known Problems Father    • Osteogenesis imperfecta Mother    • Osteogenesis imperfecta Brother    • Osteogenesis imperfecta Brother    • Breast cancer Neg Hx    • Ovarian cancer Neg Hx    • Uterine cancer Neg Hx    • Colon cancer Neg Hx       No Known Allergies   Current Outpatient Medications on File Prior to Visit    Medication Sig Dispense Refill   • cholecalciferol (VITAMIN D3) 250 MCG (43795 UT) capsule Take 1 capsule by mouth Daily. 90 capsule 3   • Prenatal MV & Min w/FA-DHA (PRENATAL ADULT GUMMY/DHA/FA PO) Take 1 tablet by mouth Daily.     • valACYclovir (VALTREX) 1000 MG tablet TAKE 2 TABLETS BY MOUTH 2 TIMES EVERY DAY FOR 1 DAY     • vitamin D (ERGOCALCIFEROL) 1.25 MG (87286 UT) capsule capsule Take 1 capsule by mouth 1 (One) Time Per Week. 12 capsule 3     No current facility-administered medications on file prior to visit.        Past obstetric, gynecological, medical, surgical, family and social history reviewed.  Relevant lab work and imaging reviewed.    Review of systems  Constitutional:  denies fever, chills, malaise.   ENT/Mouth:  denies sore throat, tinnitis  Eyes: denies vision changes/pain  CV:  denies chest pain  Respiratory:  denies cough/SOB  GI:  denies N/V, diarrhea, abdominal pain.    :   denies dysuria  Skin:  denies lesions or pruritis   Neuro:  denies weakness, focal neurologic symptoms    Vitals:    23 0924   BP: 110/56   BP Location: Right arm   Patient Position: Sitting   Pulse: 71   Temp: 98.6 °F (37 °C)   TempSrc: Temporal   Weight: 66.9 kg (147 lb 6.4 oz)       PHYSICAL EXAM   GENERAL: Not in acute distress, AAOx3, pleasant  CARDIO: regular rate and rhythm  PULM: symmetric chest rise, speaking in complete sentences without difficulty  NEURO: awake, alert and oriented to person, place, and time  ABDOMINAL: No fundal tenderness, no rebound or guarding, gravid  EXTREMITIES: no bilateral lower extremity edema/tenderness  SKIN: Warm, well-perfused      ULTRASOUND   Please view full ultrasound note on Imaging tab in ViewPoint.  Cephalic presentation.  Posterior placenta.  MVP 7.5 cm, which is normal.  Normal limited anatomy.    Cervical length 3.9 cm, which is normal.    ASSESSMENT/COUNSELIN y.o. G1  P  at  22 2 /7 weeks gestation (Estimated Date of Delivery: 23).      -Pregnancy  [ X ] stable  [   ] improving [  ] worsening    Diagnoses and all orders for this visit:    1. Osteogenesis imperfecta (Primary)  -     [facility]; Future    2. Vitamin D deficiency  Overview:  -vit D 25 > 19  -moderately compliant with vit D 19846 IU weekly   -plan for vit D 49805 IU daily to improve compliance  -recheck vit D with GTT    Orders:  -     [facility]; Future    3. Carrier of spinal muscular atrophy  Overview:  -screen pos for carrier status, FOB testing pending         Osteogenesis  The patient and many of her family members have OI Type 1.  She has not had a fracture in many years--since she was a child.  Her mother has OI and had all vaginal deliveries without issues.   90% of patients with OI have pathogenic (disease-causing) DNA changes in the COL1A1 or the COL1A2 genes. Changes in COL1A1  and COL1A2 predominantly cause the common OI types I - IV. Many individuals with OI type I or type IV inherit the variant from an affected parent in an autosomal dominant manner.     Previously, there was suggested theory that  delivery was safer and less invasive than vaginal delivery for a fetus diagnosed with OI although there was no evidence to support this theory. A recent, large-cohort analysis of 540 individuals showed that delivery by caesarean section is not associated with decreased at-birth fracture rate. Comparisons of patients with diagnoses of OI types I, II, IV showed that at-birth fractures did not occur at a higher rate between vaginal and caesarean delivery.   They found that 92.6% of type 3 OI, 50.7% of type 4 OI, and 17.2% of type 1 OI cases had at-birth fractures regardless of delivery and that babies that showed prenatal fractures were more likely to be delivered by  but this did not decrease fracture development at birth. It should be noted that approximately 40% of OI type 3 cases were breech presentations as  compared to the general population (5%). Therefore caesarean section should be performed for usual obstetric indications but not solely for the prevention of new fractures (PMID: 47984572).    References:   Lisa S, Noel M, Edil D, Elsa D, Susie JR, Terry RD, Mitch PA, Nara MB, Teddy T, Sandeep J, Stef M, Viviana PH, Nathaniel M, Ruy M, Leisa FH, Daria F, Yuniel VR, Matthew B; Members of the BBD Consortium, Parth YANCEY.  delivery is not associated with decreased at-birth fracture rates in osteogenesis imperfecta. Mariah Med. 2016 Luis;18(6):570-6. doi: 10.1038/gim.2015.131. Epub 2015 Oct 1. PMID: 72680574; PMCID: [id number].    I discussed this with the patient and relayed that ultimately delivery would be a risk/benefit discussion between she and her primary OB.      Vistara unfortunately could not be performed due to inability to tell between mom and baby's DNA (and mom has OI).  Patient will do  testing.      We will do cervical length screening given her altered collagen cross-linking.  These have been negative.  Will do 1 more in 1.5 weeks with growth.      Carrier SMA:  Father negative    Vitamin D def refractory to treatment in setting of maternal OI:  Pt to see Endo in February.  Will follow up recs.     Summary of Plan  -1 more CL before 24 weeks.   -Follow up endo recs  -Serial growth exams at primary OB after that  -Consider weekly ANFS for fetus at risk for OI between 32-36 weeks  -Delivery 39 weeks.  CS reserved for usual obstetric indications.     Follow-up: 1.5 wks CL    Thank you for the consult and opportunity to care for this patient.  Please feel free to reach out with any questions or concerns.      I spent 10 minutes caring for this patient on this date of service. This time includes time spent by me in the following activities: preparing for the visit, reviewing tests, obtaining and/or reviewing a separately obtained history, performing a medically appropriate  examination and/or evaluation, counseling and educating the patient/family/caregiver and independently interpreting results and communicating that information with the patient/family/caregiver with greater than 50% spent in counseling and coordination of care.     Malissa White MD [facility]  Maternal Fetal Medicine-[facility]  Office: [phone]  [email or web address]

## 2023-01-26 ENCOUNTER — ROUTINE PRENATAL (OUTPATIENT)
Dept: OBSTETRICS AND GYNECOLOGY | Age: 23
End: 2023-01-26
Payer: COMMERCIAL

## 2023-01-26 VITALS — DIASTOLIC BLOOD PRESSURE: 66 MMHG | WEIGHT: 146.6 LBS | SYSTOLIC BLOOD PRESSURE: 120 MMHG | BODY MASS INDEX: 24.4 KG/M2

## 2023-01-26 DIAGNOSIS — Q78.0 OSTEOGENESIS IMPERFECTA TYPE I: ICD-10-CM

## 2023-01-26 DIAGNOSIS — O44.00 PLACENTA PREVIA ANTEPARTUM: ICD-10-CM

## 2023-01-26 DIAGNOSIS — Z13.89 SCREENING FOR HEMATURIA OR PROTEINURIA: ICD-10-CM

## 2023-01-26 DIAGNOSIS — N89.8 VAGINAL DISCHARGE: ICD-10-CM

## 2023-01-26 DIAGNOSIS — E55.9 VITAMIN D DEFICIENCY: ICD-10-CM

## 2023-01-26 DIAGNOSIS — Z82.79 FAMILY HISTORY OF OSTEOGENESIS IMPERFECTA: ICD-10-CM

## 2023-01-26 DIAGNOSIS — Z14.8 CARRIER OF SPINAL MUSCULAR ATROPHY: ICD-10-CM

## 2023-01-26 DIAGNOSIS — Z3A.23 23 WEEKS GESTATION OF PREGNANCY: Primary | ICD-10-CM

## 2023-01-26 PROCEDURE — 0502F SUBSEQUENT PRENATAL CARE: CPT | Performed by: STUDENT IN AN ORGANIZED HEALTH CARE EDUCATION/TRAINING PROGRAM

## 2023-01-26 NOTE — PROGRESS NOTES
Gianluca Leyva, a 22 y.o.  at 23w1d, presents for OB follow-up.  She is doing well today.  Denies loss of fluid, vaginal bleeding, and contractions.  Endorses fetal movements.  Having some thin, fishy discharge she is concerned about.    Objective  BP Readings from Last 3 Encounters:   23 120/66   23 110/56   23 112/56      Wt Readings from Last 3 Encounters:   23 66.5 kg (146 lb 9.6 oz)   23 66.9 kg (147 lb 6.4 oz)   23 64.9 kg (143 lb)      Total weight gain this pregnancy: 4.355 kg (9 lb 9.6 oz)    General: Awake, alert, no apparent distress  Respiratory: No increased work of breathing  Abdomen: Fundus soft and nontender  Extremity: Nontender, no edema  : thin white vaginal discharge    A/P  Diagnoses and all orders for this visit:    1. 23 weeks gestation of pregnancy (Primary)  Overview:  -PNL: plan for 1hr GTT/CBC/ level at next visit  -Pap: n/a  -Genetics: cfDNA/msAFP neg, pos SMA carrier - see below, anatomy Us normal with MFM  -Imm: RI, mary unk (unsure hx, draw titer with next bld draw), tdap at next visit, s/p covid vaccine, rec'd booster, s/p flu shot  -Contraception: discuss at future visit  -Birthplan: discuss at future visit  -Care coordination: accepts blood transfusions, no latex allergy, call schedule reviewed      Orders:  -     POC Urinalysis Dipstick  -     NuSwab Vaginitis (VG) - Swab, Vagina    2. Screening for hematuria or proteinuria  -     POC Urinalysis Dipstick    3. Vitamin D deficiency  Overview:  -vit D 25 > 19  -moderately compliant with vit D 36008 IU weekly but now taking gummies more consistently  -scheduled to see Endo  > recheck vit D with GTT if not completed with them      4. Family history of osteogenesis imperfecta  Overview:  Patient's mother, three other siblings, and maternal grandmother      5. Placenta previa antepartum  Overview:  Resolved      6. Osteogenesis imperfecta type I  Overview:  -Patient, mother, and her two  brothers all with OI, h/o multiple fractures  -S/p [facility] consult - recs for vaginal delivery unless other obstetric indication for  and CL screening at [facility] office, thus far normal  -[facility] discussed prenatal dx of OI with CVS vs amnio, declined      7. Carrier of spinal muscular atrophy  Overview:  -screen pos for carrier status  -FOB negative      8. Vaginal discharge  -     NuSwab Vaginitis (VG) - Swab, Vagina        Labor precautions reviewed  Return in about 5 weeks (around 3/2/2023) for F/u with 1hr GTT/CBC.    Kim Judge MD

## 2023-01-29 LAB
A VAGINAE DNA VAG QL NAA+PROBE: ABNORMAL SCORE
BVAB2 DNA VAG QL NAA+PROBE: ABNORMAL SCORE
C ALBICANS DNA VAG QL NAA+PROBE: NEGATIVE
C GLABRATA DNA VAG QL NAA+PROBE: NEGATIVE
MEGA1 DNA VAG QL NAA+PROBE: ABNORMAL SCORE
T VAGINALIS DNA VAG QL NAA+PROBE: NEGATIVE

## 2023-01-29 RX ORDER — METRONIDAZOLE 500 MG/1
500 TABLET ORAL 2 TIMES DAILY
Qty: 14 TABLET | Refills: 0 | Status: SHIPPED | OUTPATIENT
Start: 2023-01-29 | End: 2023-02-05

## 2023-01-30 ENCOUNTER — OFFICE VISIT (OUTPATIENT)
Dept: OBSTETRICS AND GYNECOLOGY | Facility: CLINIC | Age: 23
End: 2023-01-30
Payer: COMMERCIAL

## 2023-01-30 ENCOUNTER — HOSPITAL ENCOUNTER (OUTPATIENT)
Dept: ULTRASOUND IMAGING | Facility: HOSPITAL | Age: 23
Discharge: HOME | End: 2023-01-30
Admitting: OBSTETRICS & GYNECOLOGY
Payer: COMMERCIAL

## 2023-01-30 VITALS
WEIGHT: 147 LBS | TEMPERATURE: 98.6 F | DIASTOLIC BLOOD PRESSURE: 53 MMHG | HEART RATE: 92 BPM | SYSTOLIC BLOOD PRESSURE: 107 MMHG | BODY MASS INDEX: 24.46 KG/M2

## 2023-01-30 DIAGNOSIS — E55.9 VITAMIN D DEFICIENCY: ICD-10-CM

## 2023-01-30 DIAGNOSIS — E55.9 VITAMIN D DEFICIENCY: Primary | ICD-10-CM

## 2023-01-30 DIAGNOSIS — Q78.0 OSTEOGENESIS IMPERFECTA: ICD-10-CM

## 2023-01-30 DIAGNOSIS — Q78.0 OSTEOGENESIS IMPERFECTA TYPE I: ICD-10-CM

## 2023-01-30 LAB
Lab: ABNORMAL
Lab: POSITIVE
NTRA ALPHA-THALASSEMIA: NEGATIVE
NTRA BETA-HEMOGLOBINOPATHIES: NEGATIVE
NTRA CANAVAN DISEASE: NEGATIVE
NTRA CYSTIC FIBROSIS: NEGATIVE
NTRA DUCHENNE/BECKER MUSCULAR DYSTROPHY: NEGATIVE
NTRA FAMILIAL DYSAUTONOMIA: NEGATIVE
NTRA FRAGILE X SYNDROME: NEGATIVE
NTRA GALACTOSEMIA: NEGATIVE
NTRA GAUCHER DISEASE: NEGATIVE
NTRA MEDIUM CHAIN ACYL-COA DEHYDROGENASE DEFICIENCY: NEGATIVE
NTRA POLYCYSTIC KIDNEY DISEASE, AUTOSOMAL RECESSIVE: NEGATIVE
NTRA SMITH-LEMLI-OPITZ SYNDROME: NEGATIVE
NTRA SPINAL MUSCULAR ATROPHY: POSITIVE
NTRA TAY-SACHS DISEASE: NEGATIVE

## 2023-01-30 PROCEDURE — 76816 OB US FOLLOW-UP PER FETUS: CPT

## 2023-01-30 PROCEDURE — 99212 OFFICE O/P EST SF 10 MIN: CPT | Performed by: OBSTETRICS & GYNECOLOGY

## 2023-01-30 PROCEDURE — 76816 OB US FOLLOW-UP PER FETUS: CPT | Performed by: OBSTETRICS & GYNECOLOGY

## 2023-01-30 PROCEDURE — 76817 TRANSVAGINAL US OBSTETRIC: CPT | Performed by: OBSTETRICS & GYNECOLOGY

## 2023-01-30 PROCEDURE — 76817 TRANSVAGINAL US OBSTETRIC: CPT

## 2023-01-30 NOTE — PROGRESS NOTES
Pt reports that she is doing well and denies vaginal bleeding, cramping, contractions or LOF at this time. Reports active fetal movement. Denies HA, visual changes or epigastric pain. Denies any additional complaints at time of appointment. Next OB appointment scheduled for 3/2.    Vitals:    01/30/23 0927   BP: 107/53   Pulse: 92   Temp: 98.6 °F (37 °C)

## 2023-01-30 NOTE — PROGRESS NOTES
MATERNAL FETAL MEDICINE Consult Note    Dear Dr Kim Judge MD:    Thank you for your kind referral of Gianluca Leyva.  As you know, she is a 22 y.o. G1  P  at  23 5 /7 weeks gestation (Estimated Date of Delivery: 5/24/23). This is a consult.      Her antepartum course is complicated by:  Maternal Osteogenesis imperfecta Type 1  Vitamin D def refractory to treatment    Aneuploidy Screening: low risk      HPI: Today, she denies headache, blurry vision, RUQ pain. No vaginal bleeding, no contractions.     Review of History:  Past Medical History:   Diagnosis Date   • Osteogenesis imperfecta      Past Surgical History:   Procedure Laterality Date   • ELBOW ARTHROPLASTY     • KNEE ARTHROPLASTY     • MUSCLE REPAIR      shoulder   • SHOULDER ARTHROSCOPY           Social History     Socioeconomic History   • Marital status: Single   Tobacco Use   • Smoking status: Never   • Smokeless tobacco: Never   Vaping Use   • Vaping Use: Never used   Substance and Sexual Activity   • Alcohol use: Never   • Drug use: Never   • Sexual activity: Yes     Partners: Male     Birth control/protection: None     Family History   Problem Relation Age of Onset   • No Known Problems Father    • Osteogenesis imperfecta Mother    • Osteogenesis imperfecta Brother    • Osteogenesis imperfecta Brother    • Breast cancer Neg Hx    • Ovarian cancer Neg Hx    • Uterine cancer Neg Hx    • Colon cancer Neg Hx       No Known Allergies   Current Outpatient Medications on File Prior to Visit   Medication Sig Dispense Refill   • cholecalciferol (VITAMIN D3) 250 MCG (59469 UT) capsule Take 1 capsule by mouth Daily. 90 capsule 3   • Prenatal MV & Min w/FA-DHA (PRENATAL ADULT GUMMY/DHA/FA PO) Take 1 tablet by mouth Daily.     • vitamin D (ERGOCALCIFEROL) 1.25 MG (93291 UT) capsule capsule Take 1 capsule by mouth 1 (One) Time Per Week. 12 capsule 3   • metroNIDAZOLE (FLAGYL) 500 MG tablet Take 1 tablet by mouth 2 (Two) Times a Day for 7 days. 14 tablet 0    • valACYclovir (VALTREX) 1000 MG tablet TAKE 2 TABLETS BY MOUTH 2 TIMES EVERY DAY FOR 1 DAY       No current facility-administered medications on file prior to visit.        Past obstetric, gynecological, medical, surgical, family and social history reviewed.  Relevant lab work and imaging reviewed.    Review of systems  Constitutional:  denies fever, chills, malaise.   ENT/Mouth:  denies sore throat, tinnitis  Eyes: denies vision changes/pain  CV:  denies chest pain  Respiratory:  denies cough/SOB  GI:  denies N/V, diarrhea, abdominal pain.    :   denies dysuria  Skin:  denies lesions or pruritis   Neuro:  denies weakness, focal neurologic symptoms    Vitals:    23 0927   BP: 107/53   BP Location: Right arm   Patient Position: Sitting   Pulse: 92   Temp: 98.6 °F (37 °C)   TempSrc: Temporal   Weight: 66.7 kg (147 lb)       PHYSICAL EXAM   GENERAL: Not in acute distress, AAOx3, pleasant  CARDIO: regular rate and rhythm  PULM: symmetric chest rise, speaking in complete sentences without difficulty  NEURO: awake, alert and oriented to person, place, and time  ABDOMINAL: No fundal tenderness, no rebound or guarding, gravid  EXTREMITIES: no bilateral lower extremity edema/tenderness  SKIN: Warm, well-perfused      ULTRASOUND   Please view full ultrasound note on Imaging tab in ViewPoint.  Cephalic presentation  Posterior placenta.  MVP 6.1 cm, which is normal.   g ( 58% AC 63%)  Cervical length 3.8 cm, which is normal.      ASSESSMENT/COUNSELIN y.o. G1  P  at  23 5 /7 weeks gestation (Estimated Date of Delivery: 23).     -Pregnancy  [ X ] stable  [   ] improving [  ] worsening    Diagnoses and all orders for this visit:    1. Vitamin D deficiency (Primary)  Overview:  -vit D 25 > 19  -moderately compliant with vit D 58933 IU weekly but now taking gummies more consistently  -scheduled to see Endo  > recheck vit D with GTT if not completed with them      2. Osteogenesis imperfecta type  I  Overview:  -Patient, mother, and her two brothers all with OI, h/o multiple fractures  -S/p [facility] consult - recs for vaginal delivery unless other obstetric indication for  and CL screening at [facility] office, thus far normal  -[facility] discussed prenatal dx of OI with CVS vs amnio, declined         Osteogenesis  The patient and many of her family members have OI Type 1.  She has not had a fracture in many years--since she was a child.  Her mother has OI and had all vaginal deliveries without issues.   90% of patients with OI have pathogenic (disease-causing) DNA changes in the COL1A1 or the COL1A2 genes. Changes in COL1A1  and COL1A2 predominantly cause the common OI types I - IV. Many individuals with OI type I or type IV inherit the variant from an affected parent in an autosomal dominant manner.     Previously, there was suggested theory that  delivery was safer and less invasive than vaginal delivery for a fetus diagnosed with OI although there was no evidence to support this theory. A recent, large-cohort analysis of 540 individuals showed that delivery by caesarean section is not associated with decreased at-birth fracture rate. Comparisons of patients with diagnoses of OI types I, II, IV showed that at-birth fractures did not occur at a higher rate between vaginal and caesarean delivery.   They found that 92.6% of type 3 OI, 50.7% of type 4 OI, and 17.2% of type 1 OI cases had at-birth fractures regardless of delivery and that babies that showed prenatal fractures were more likely to be delivered by  but this did not decrease fracture development at birth. It should be noted that approximately 40% of OI type 3 cases were breech presentations as compared to the general population (5%). Therefore caesarean section should be performed for usual obstetric indications but not solely for the prevention of new fractures (PMID: 10707298).    References:   Lisa S, Noel M, Edil ARREGUIN, Elsa ARREGUIN,  Susie JR, Terry RD, Mitch PA, Nara MB, Teddy T, Sandeep J, Stef M, Viviana PH, Nathaniel M, Ruy M, Leisa FH, Daria F, Yuniel VR, Matthew B; Members of the BBD Consortium, Parth YANCEY.  delivery is not associated with decreased at-birth fracture rates in osteogenesis imperfecta. Mariah Med. 2016 Luis;18(6):570-6. doi: 10.1038/gim.2015.131. Epub 2015 Oct 1. PMID: 12244343; PMCID: [id number].    I discussed this with the patient and relayed that ultimately delivery would be a risk/benefit discussion between she and her primary OB.      Vistara unfortunately could not be performed due to inability to tell between mom and baby's DNA (and mom has OI).  Patient will do  testing.      We will do cervical length screening given her altered collagen cross-linking.  These have been negative.  No more indicated as pt will be >24 weeks unless she is symptomatic.      Carrier SMA:  Father negative    Vitamin D def refractory to treatment in setting of maternal OI:  Pt to see Endo in .  Will follow up recs.      Summary of Plan  -Follow up endo recs  -Serial growth exams at primary OB after that  -Consider weekly ANFS for fetus at risk for OI between 32-36 weeks  -Delivery 39 weeks.  CS reserved for usual obstetric indications.     Follow-up: No follow up with MFM scheduled but happy to see at request of primary OB or patient.      Thank you for the consult and opportunity to care for this patient.  Please feel free to reach out with any questions or concerns.      I spent 10 minutes caring for this patient on this date of service. This time includes time spent by me in the following activities: preparing for the visit, reviewing tests, obtaining and/or reviewing a separately obtained history, performing a medically appropriate examination and/or evaluation, counseling and educating the patient/family/caregiver and independently interpreting results and communicating that information with the  patient/family/caregiver with greater than 50% spent in counseling and coordination of care.     5 minutes reading US.      Malissa White MD [facility]  Maternal Fetal Medicine-[facility]  Office: [phone]  [email or web address]

## 2023-01-30 NOTE — LETTER
January 30, 2023     Kim Judge MD  [address] [facility] [address]    Patient: Gianluca Leyva   YOB: 2000   Date of Visit: 1/30/2023       Dear Kim Judge MD,    Thank you for referring Gianluca Leyva to me for evaluation. Below is a copy of my consult note.    If you have questions, please do not hesitate to call me. I look forward to following Gianluca along with you.         Sincerely,        Malissa White MD        MATERNAL FETAL MEDICINE Consult Note    Dear Dr Kim Judge MD:    Thank you for your kind referral of Gianluca Leyva.  As you know, she is a 22 y.o. G1  P  at  23 5 /7 weeks gestation (Estimated Date of Delivery: 5/24/23). This is a consult.      Her antepartum course is complicated by:  Maternal Osteogenesis imperfecta Type 1  Vitamin D def refractory to treatment    Aneuploidy Screening: low risk      HPI: Today, she denies headache, blurry vision, RUQ pain. No vaginal bleeding, no contractions.     Review of History:  Past Medical History:   Diagnosis Date   • Osteogenesis imperfecta      Past Surgical History:   Procedure Laterality Date   • ELBOW ARTHROPLASTY     • KNEE ARTHROPLASTY     • MUSCLE REPAIR      shoulder   • SHOULDER ARTHROSCOPY           Social History     Socioeconomic History   • Marital status: Single   Tobacco Use   • Smoking status: Never   • Smokeless tobacco: Never   Vaping Use   • Vaping Use: Never used   Substance and Sexual Activity   • Alcohol use: Never   • Drug use: Never   • Sexual activity: Yes     Partners: Male     Birth control/protection: None     Family History   Problem Relation Age of Onset   • No Known Problems Father    • Osteogenesis imperfecta Mother    • Osteogenesis imperfecta Brother    • Osteogenesis imperfecta Brother    • Breast cancer Neg Hx    • Ovarian cancer Neg Hx    • Uterine cancer Neg Hx    • Colon cancer Neg Hx       No Known Allergies   Current Outpatient Medications on File Prior to Visit    Medication Sig Dispense Refill   • cholecalciferol (VITAMIN D3) 250 MCG (93342 UT) capsule Take 1 capsule by mouth Daily. 90 capsule 3   • Prenatal MV & Min w/FA-DHA (PRENATAL ADULT GUMMY/DHA/FA PO) Take 1 tablet by mouth Daily.     • vitamin D (ERGOCALCIFEROL) 1.25 MG (62833 UT) capsule capsule Take 1 capsule by mouth 1 (One) Time Per Week. 12 capsule 3   • metroNIDAZOLE (FLAGYL) 500 MG tablet Take 1 tablet by mouth 2 (Two) Times a Day for 7 days. 14 tablet 0   • valACYclovir (VALTREX) 1000 MG tablet TAKE 2 TABLETS BY MOUTH 2 TIMES EVERY DAY FOR 1 DAY       No current facility-administered medications on file prior to visit.        Past obstetric, gynecological, medical, surgical, family and social history reviewed.  Relevant lab work and imaging reviewed.    Review of systems  Constitutional:  denies fever, chills, malaise.   ENT/Mouth:  denies sore throat, tinnitis  Eyes: denies vision changes/pain  CV:  denies chest pain  Respiratory:  denies cough/SOB  GI:  denies N/V, diarrhea, abdominal pain.    :   denies dysuria  Skin:  denies lesions or pruritis   Neuro:  denies weakness, focal neurologic symptoms    Vitals:    01/30/23 0927   BP: 107/53   BP Location: Right arm   Patient Position: Sitting   Pulse: 92   Temp: 98.6 °F (37 °C)   TempSrc: Temporal   Weight: 66.7 kg (147 lb)       PHYSICAL EXAM   GENERAL: Not in acute distress, AAOx3, pleasant  CARDIO: regular rate and rhythm  PULM: symmetric chest rise, speaking in complete sentences without difficulty  NEURO: awake, alert and oriented to person, place, and time  ABDOMINAL: No fundal tenderness, no rebound or guarding, gravid  EXTREMITIES: no bilateral lower extremity edema/tenderness  SKIN: Warm, well-perfused      ULTRASOUND   Please view full ultrasound note on Imaging tab in View[employer].  Cephalic presentation  Posterior placenta.  MVP 6.1 cm, which is normal.   g ( 58% AC 63%)  Cervical length 3.8 cm, which is normal.       ASSESSMENT/COUNSELIN y.o. G1  P  at  23 5 /7 weeks gestation (Estimated Date of Delivery: 23).     -Pregnancy  [ X ] stable  [   ] improving [  ] worsening    Diagnoses and all orders for this visit:    1. Vitamin D deficiency (Primary)  Overview:  -vit D 25 > 19  -moderately compliant with vit D 63927 IU weekly but now taking gummies more consistently  -scheduled to see Endo  > recheck vit D with GTT if not completed with them      2. Osteogenesis imperfecta type I  Overview:  -Patient, mother, and her two brothers all with OI, h/o multiple fractures  -S/p [facility] consult - recs for vaginal delivery unless other obstetric indication for  and CL screening at [facility] office, thus far normal  -[facility] discussed prenatal dx of OI with CVS vs amnio, declined         Osteogenesis  The patient and many of her family members have OI Type 1.  She has not had a fracture in many years--since she was a child.  Her mother has OI and had all vaginal deliveries without issues.   90% of patients with OI have pathogenic (disease-causing) DNA changes in the COL1A1 or the COL1A2 genes. Changes in COL1A1  and COL1A2 predominantly cause the common OI types I - IV. Many individuals with OI type I or type IV inherit the variant from an affected parent in an autosomal dominant manner.     Previously, there was suggested theory that  delivery was safer and less invasive than vaginal delivery for a fetus diagnosed with OI although there was no evidence to support this theory. A recent, large-cohort analysis of 540 individuals showed that delivery by caesarean section is not associated with decreased at-birth fracture rate. Comparisons of patients with diagnoses of OI types I, II, IV showed that at-birth fractures did not occur at a higher rate between vaginal and caesarean delivery.   They found that 92.6% of type 3 OI, 50.7% of type 4 OI, and 17.2% of type 1 OI cases had at-birth fractures regardless of delivery and  that babies that showed prenatal fractures were more likely to be delivered by  but this did not decrease fracture development at birth. It should be noted that approximately 40% of OI type 3 cases were breech presentations as compared to the general population (5%). Therefore caesarean section should be performed for usual obstetric indications but not solely for the prevention of new fractures (PMID: 98755185).    References:   Lisa S, Noel M, Edil D, Elsa D, Susie JR, Terry RD, Mitch PA, Nara MB, Teddy T, Sandeep J, Stef M, Viviana PH, Nathaniel M, Ruy M, Leisa FH, Daria F, Yuniel VR, Matthew B; Members of the [employer], Parth YANCEY.  delivery is not associated with decreased at-birth fracture rates in osteogenesis imperfecta. Mariah Med. 2016 Luis;18(6):570-6. doi: 10.1038/gim.2015.131. Epub 2015 Oct 1. PMID: 20853218; PMCID: USH9434214.    I discussed this with the patient and relayed that ultimately delivery would be a risk/benefit discussion between she and her primary OB.      Vistara unfortunately could not be performed due to inability to tell between mom and baby's DNA (and mom has OI).  Patient will do  testing.      We will do cervical length screening given her altered collagen cross-linking.  These have been negative.  No more indicated as pt will be >24 weeks unless she is symptomatic.      Carrier SMA:  Father negative    Vitamin D def refractory to treatment in setting of maternal OI:  Pt to see Endo in .  Will follow up recs.      Summary of Plan  -Follow up endo recs  -Serial growth exams at primary OB after that  -Consider weekly ANFS for fetus at risk for OI between 32-36 weeks  -Delivery 39 weeks.  CS reserved for usual obstetric indications.     Follow-up: No follow up with MFM scheduled but happy to see at request of primary OB or patient.      Thank you for the consult and opportunity to care for this patient.  Please feel free to  reach out with any questions or concerns.      I spent 10 minutes caring for this patient on this date of service. This time includes time spent by me in the following activities: preparing for the visit, reviewing tests, obtaining and/or reviewing a separately obtained history, performing a medically appropriate examination and/or evaluation, counseling and educating the patient/family/caregiver and independently interpreting results and communicating that information with the patient/family/caregiver with greater than 50% spent in counseling and coordination of care.     5 minutes reading US.      Malissa White MD FACOG  Maternal Fetal Medicine-[facility]  Office: [phone]  [email or web address]

## 2023-02-10 ENCOUNTER — TELEPHONE (OUTPATIENT)
Dept: OBSTETRICS AND GYNECOLOGY | Age: 23
End: 2023-02-10

## 2023-02-10 NOTE — TELEPHONE ENCOUNTER
Caller: Gianluca Leyva    Relationship to patient: Self    Best call back number: [phone]    Patient is needing: PT CALLED AND WANTING TO SPEAK WITH A NURSE, PT STATES SHE HAD SOME BLEEDING/SPOTTING LAST NIGHT

## 2023-02-10 NOTE — TELEPHONE ENCOUNTER
Let's have patient come in for exam with NP/PA early next week.  Then we can rest assured everything is okay.

## 2023-02-10 NOTE — TELEPHONE ENCOUNTER
Pt calls stating her bleeding is not coming from her vagina, only noticeable when wiping, onset last night at 3am. She is using a liner for light bleeding and discharge, changing liner once a day. She was diagnosed with BV 01/26 and has finished the medication for this. States she had some itching when first noticing the bleeding but denies any itching now. Pt states she thinks the bleeding is from a possible tear or stretch of her labia. Denies any pain or cramping. Please advise if and when pt needs appointment for evaluation, we have one NP in office today and she is fully booked

## 2023-02-15 ENCOUNTER — ROUTINE PRENATAL (OUTPATIENT)
Dept: OBSTETRICS AND GYNECOLOGY | Age: 23
End: 2023-02-15
Payer: COMMERCIAL

## 2023-02-15 VITALS — BODY MASS INDEX: 24.3 KG/M2 | DIASTOLIC BLOOD PRESSURE: 62 MMHG | WEIGHT: 146 LBS | SYSTOLIC BLOOD PRESSURE: 104 MMHG

## 2023-02-15 DIAGNOSIS — Q78.0 OSTEOGENESIS IMPERFECTA TYPE I: ICD-10-CM

## 2023-02-15 DIAGNOSIS — Z14.8 CARRIER OF SPINAL MUSCULAR ATROPHY: ICD-10-CM

## 2023-02-15 DIAGNOSIS — Z34.02 PRENATAL CARE, FIRST PREGNANCY IN SECOND TRIMESTER: Primary | ICD-10-CM

## 2023-02-15 DIAGNOSIS — Z82.79 FAMILY HISTORY OF OSTEOGENESIS IMPERFECTA: ICD-10-CM

## 2023-02-15 DIAGNOSIS — N89.8 VAGINAL DISCHARGE: ICD-10-CM

## 2023-02-15 DIAGNOSIS — Z13.89 SCREENING FOR BLOOD OR PROTEIN IN URINE: ICD-10-CM

## 2023-02-15 LAB
GLUCOSE UR STRIP-MCNC: NEGATIVE MG/DL
PROT UR STRIP-MCNC: NEGATIVE MG/DL

## 2023-02-15 PROCEDURE — 0502F SUBSEQUENT PRENATAL CARE: CPT | Performed by: NURSE PRACTITIONER

## 2023-02-15 RX ORDER — MICONAZOLE NITRATE 1200MG-2%
1 KIT VAGINAL NIGHTLY
Qty: 1 KIT | Refills: 0 | Status: SHIPPED | OUTPATIENT
Start: 2023-02-15 | End: 2023-03-02

## 2023-02-15 NOTE — PROGRESS NOTES
"Chief Complaint   Patient presents with   • Routine Prenatal Visit     Labia has been bleeding       HPI: 22 y.o.  at 26w0d     Pt presents today for add on OB visit  States she had noticed some vaginal spotting but thinks it is coming from her labia  She states she feels like she has a \"tear\" or irritated area to labia  Last time she noticed any spotting was last Thursday  States it is not painful but just feels irritated  She has noticed some vaginal discharge, no odor or itching  Was treated for BV a few weeks ago  No new sexual partners  Pt of Dr. Judge     Vitals:    02/15/23 0957   BP: 104/62   Weight: 66.2 kg (146 lb)       ROS:  GI:  Negative  : Vaginal discharge, irritation   Pulmonary: Negative     A/P  1. Intrauterine pregnancy at 26w0d   2. Pregnancy Risk:  HIGH RISK     No open lesions, sores noted to external vagina, there was some erythema noted to labia minora  SSE - CX closed, no bleeding noted, white discharge noted, swab collected    Diagnoses and all orders for this visit:    1. Prenatal care, first pregnancy in second trimester (Primary)    2. Screening for blood or protein in urine  -     POC Urinalysis Dipstick    3. Family history of osteogenesis imperfecta    4. Osteogenesis imperfecta type I    5. Carrier of spinal muscular atrophy    6. Vaginal discharge  -     NuSwab BV & Candida - Swab, Vagina    Other orders  -     Miconazole Nitrate-Wipes (Monistat 7 Complete Therapy) 100-2 MG-% kit; Insert 1 suppository into the vagina Every Night.  Dispense: 1 kit; Refill: 0        -----------------------  PLAN:   Treat for yeast  Swab sent for BV and yeast  To L&D with any further bleeding  Keep schedule f/u with Dr. Nu Purvis, APRN  2/15/2023 10:22 EST  "

## 2023-02-17 LAB
A VAGINAE DNA VAG QL NAA+PROBE: ABNORMAL SCORE
BVAB2 DNA VAG QL NAA+PROBE: ABNORMAL SCORE
C ALBICANS DNA VAG QL NAA+PROBE: POSITIVE
C GLABRATA DNA VAG QL NAA+PROBE: NEGATIVE
MEGA1 DNA VAG QL NAA+PROBE: ABNORMAL SCORE

## 2023-02-20 ENCOUNTER — OFFICE VISIT (OUTPATIENT)
Dept: ENDOCRINOLOGY | Age: 23
End: 2023-02-20
Payer: COMMERCIAL

## 2023-02-20 VITALS
OXYGEN SATURATION: 98 % | HEIGHT: 65 IN | HEART RATE: 84 BPM | DIASTOLIC BLOOD PRESSURE: 70 MMHG | SYSTOLIC BLOOD PRESSURE: 110 MMHG | WEIGHT: 148.6 LBS | BODY MASS INDEX: 24.76 KG/M2

## 2023-02-20 DIAGNOSIS — Z3A.26 26 WEEKS GESTATION OF PREGNANCY: ICD-10-CM

## 2023-02-20 DIAGNOSIS — E55.9 VITAMIN D DEFICIENCY: Primary | ICD-10-CM

## 2023-02-20 PROCEDURE — 99203 OFFICE O/P NEW LOW 30 MIN: CPT | Performed by: INTERNAL MEDICINE

## 2023-02-20 NOTE — PROGRESS NOTES
New Patient      Chief Complaint    Chief Complaint   Patient presents with   • Vitamin D Deficiency        HPI:   Gianluca Leyva is a 22 y.o. female sent to us for consultative evaluation and management of low vitamin D.  She is now 23 weeks pregnant and has had a low vitamin D level for a long time.  Her expected date of delivery is May 24, 2023.  She was given a prescription for 50,000 international units once a week but she stated that she just cannot bring herself to take the pills.  She takes 2 vitamin D Gummies every morning.  Her last 25-hydroxy vitamin D on December 27, 2022, was 19.1 ng/mL compared to 25.2 ng/mL on November 2, 2022 and this was done before she increased her vitamin D intake to 2 g daily.  She is currently doing well and her main concern on today's visit was to recheck her vitamin D level.    Past Medical History:   Diagnosis Date   • Osteogenesis imperfecta           ROS:  Pertinent to this visit, only as mentioned above.  The rest was negative.    Social History     Socioeconomic History   • Marital status: Single   Tobacco Use   • Smoking status: Never     Passive exposure: Never   • Smokeless tobacco: Never   Vaping Use   • Vaping Use: Never used   Substance and Sexual Activity   • Alcohol use: Never   • Drug use: Never   • Sexual activity: Yes     Partners: Male     Birth control/protection: None   :   Physical Exam:  GENERAL: She otherwise looked well.  HEENT: Blue sclera.  NECK: Normal examination without any palpable thyroid enlargement or discrete thyroid nodules  LUNGS: Clear to auscultation bilaterally  CVS: RRR  EXTREMITIES: Normal examination.    Assessment:  1.  Vitamin D insufficiency in a patient with osteogenesis imperfecta    Diagnoses and all orders for this visit:    1. Vitamin D deficiency (Primary)  Overview:  -vit D 25 > 19  -moderately compliant with vit D 42812 IU weekly but now taking gummies more consistently  -scheduled to see Endo 2/14 > recheck vit D with GTT  if not completed with them    Orders:  -     Vitamin D 25 Hydroxy  -     TSH  -     T4, Free    2. 26 weeks gestation of pregnancy  Overview:  -PNL: plan for 1hr GTT/CBC/ level at next visit  -Pap: n/a  -Genetics: cfDNA/msAFP neg, pos SMA carrier - see below, anatomy Us normal with MFM  -Imm: RI, mary unk (unsure hx, draw titer with next bld draw), tdap at next visit, s/p covid vaccine, rec'd booster, s/p flu shot  -Contraception: discuss at future visit  -Birthplan: discuss at future visit  -Care coordination: accepts blood transfusions, no latex allergy, call schedule reviewed      Orders:  -     Vitamin D 25 Hydroxy  -     TSH  -     T4, Free     Recommendations:  1.  We reviewed with Ms. Leyva the finding of the low vitamin D and her vitamin D intake.  2.  She cannot tolerate taking vitamin D pills or gels.  3.  Our recommendation was for her to increase her vitamin D intake to 4 Gummies with the heaviest meal of the day.  4.  She stated that her 2 heaviest meals are breakfast and lunch and so she will take 2 Gummies before breakfast and 2 before lunch.  5.  We are checking her vitamin D level today and again in 2 months time.  6.  We gave her the opportunity to ask questions, which we answered and we addressed her concerns.

## 2023-02-21 LAB
25(OH)D3+25(OH)D2 SERPL-MCNC: 24.4 NG/ML (ref 30–100)
T4 FREE SERPL-MCNC: 1.21 NG/DL (ref 0.93–1.7)
TSH SERPL DL<=0.005 MIU/L-ACNC: 0.74 UIU/ML (ref 0.27–4.2)

## 2023-03-02 ENCOUNTER — ROUTINE PRENATAL (OUTPATIENT)
Dept: OBSTETRICS AND GYNECOLOGY | Age: 23
End: 2023-03-02
Payer: COMMERCIAL

## 2023-03-02 VITALS — DIASTOLIC BLOOD PRESSURE: 60 MMHG | WEIGHT: 148.2 LBS | BODY MASS INDEX: 24.66 KG/M2 | SYSTOLIC BLOOD PRESSURE: 112 MMHG

## 2023-03-02 DIAGNOSIS — E55.9 VITAMIN D DEFICIENCY: ICD-10-CM

## 2023-03-02 DIAGNOSIS — Q78.0 OSTEOGENESIS IMPERFECTA TYPE I: ICD-10-CM

## 2023-03-02 DIAGNOSIS — Z13.89 SCREENING FOR BLOOD OR PROTEIN IN URINE: ICD-10-CM

## 2023-03-02 DIAGNOSIS — Z3A.23 23 WEEKS GESTATION OF PREGNANCY: ICD-10-CM

## 2023-03-02 DIAGNOSIS — Z13.0 SCREENING FOR IRON DEFICIENCY ANEMIA: ICD-10-CM

## 2023-03-02 DIAGNOSIS — Z82.79 FAMILY HISTORY OF OSTEOGENESIS IMPERFECTA: ICD-10-CM

## 2023-03-02 DIAGNOSIS — Z3A.28 28 WEEKS GESTATION OF PREGNANCY: Primary | ICD-10-CM

## 2023-03-02 DIAGNOSIS — Z13.1 SCREENING FOR DIABETES MELLITUS (DM): ICD-10-CM

## 2023-03-02 DIAGNOSIS — Z14.8 CARRIER OF SPINAL MUSCULAR ATROPHY: ICD-10-CM

## 2023-03-02 PROBLEM — O44.00 PLACENTA PREVIA ANTEPARTUM: Status: RESOLVED | Noted: 2022-12-27 | Resolved: 2023-03-02

## 2023-03-02 LAB
CLARITY, POC: CLEAR
COLOR UR: YELLOW
ERYTHROCYTE [DISTWIDTH] IN BLOOD BY AUTOMATED COUNT: 11.8 % (ref 12.3–15.4)
GLUCOSE 1H P 50 G GLC PO SERPL-MCNC: 98 MG/DL (ref 65–139)
GLUCOSE UR STRIP-MCNC: NEGATIVE MG/DL
HCT VFR BLD AUTO: 31 % (ref 34–46.6)
HGB BLD-MCNC: 10.8 G/DL (ref 12–15.9)
MCH RBC QN AUTO: 31.4 PG (ref 26.6–33)
MCHC RBC AUTO-ENTMCNC: 34.8 G/DL (ref 31.5–35.7)
MCV RBC AUTO: 90.1 FL (ref 79–97)
PLATELET # BLD AUTO: 263 10*3/MM3 (ref 140–450)
PROT UR STRIP-MCNC: NEGATIVE MG/DL
RBC # BLD AUTO: 3.44 10*6/MM3 (ref 3.77–5.28)
WBC # BLD AUTO: 7.12 10*3/MM3 (ref 3.4–10.8)

## 2023-03-02 PROCEDURE — 0502F SUBSEQUENT PRENATAL CARE: CPT | Performed by: STUDENT IN AN ORGANIZED HEALTH CARE EDUCATION/TRAINING PROGRAM

## 2023-03-02 PROCEDURE — 90715 TDAP VACCINE 7 YRS/> IM: CPT | Performed by: STUDENT IN AN ORGANIZED HEALTH CARE EDUCATION/TRAINING PROGRAM

## 2023-03-02 PROCEDURE — 90471 IMMUNIZATION ADMIN: CPT | Performed by: STUDENT IN AN ORGANIZED HEALTH CARE EDUCATION/TRAINING PROGRAM

## 2023-03-02 NOTE — PROGRESS NOTES
Gianluca Leyva, a 22 y.o.  at 28w1d, presents for OB follow-up.  She is doing well today.  Denies loss of fluid, vaginal bleeding, and contractions.  Endorses fetal movements.    Objective  BP Readings from Last 3 Encounters:   23 112/60   23 110/70   02/15/23 104/62      Wt Readings from Last 3 Encounters:   23 67.2 kg (148 lb 3.2 oz)   23 67.4 kg (148 lb 9.6 oz)   02/15/23 66.2 kg (146 lb)      Total weight gain this pregnancy: 5.08 kg (11 lb 3.2 oz)    General: Awake, alert, no apparent distress  Respiratory: No increased work of breathing  Abdomen: Fundus soft and nontender  Extremity: Nontender, no edema    A/P  Diagnoses and all orders for this visit:    1. 28 weeks gestation of pregnancy (Primary)  Overview:  -PNL: 1hr GTT/CBC today  -Pap: n/a  -Genetics: cfDNA/msAFP neg, pos SMA carrier - see below, anatomy Us normal with MFM  -Imm: RI, mary unk (unsure hx, draw titer with next bld draw), tdap today, s/p covid vaccine, rec'd booster, s/p flu shot  -Contraception: discuss at future visit  -Birthplan: discuss at future visit  -Care coordination: accepts blood transfusions, no latex allergy, call schedule reviewed      Orders:  -     POC Urinalysis Dipstick  -     Gestational Screen 1 Hr ([facility])  -     CBC (No Diff)    2. Screening for blood or protein in urine  -     POC Urinalysis Dipstick    3. Screening for diabetes mellitus (DM)  -     Gestational Screen 1 Hr ([facility])    4. Screening for iron deficiency anemia  -     CBC (No Diff)    5. Vitamin D deficiency  Overview:  -vit D 25 > 19 > 24  -moderately compliant with vit D 40058 IU weekly but now taking gummies more consistently  -managed by Endo, planning to recheck levels in 2mo      6. Family history of osteogenesis imperfecta  Overview:  Patient's mother, three other siblings, and maternal grandmother      7. 23 weeks gestation of pregnancy  Overview:  -PNL: 1hr GTT/CBC today  -Pap: n/a  -Genetics: cfDNA/msAFP neg, pos  SMA carrier - see below, anatomy Us normal with MFM  -Imm: RI, mary unk (unsure hx, draw titer with next bld draw), tdap today, s/p covid vaccine, rec'd booster, s/p flu shot  -Contraception: discuss at future visit  -Birthplan: discuss at future visit  -Care coordination: accepts blood transfusions, no latex allergy, call schedule reviewed        8. Osteogenesis imperfecta type I  Overview:  -Patient, mother, and her two brothers all with OI, h/o multiple fractures  -S/p [facility] consult - recs for vaginal delivery unless other obstetric indication for  and CL screening at [facility] office, thus far normal  -[facility] discussed prenatal dx of OI with CVS vs amnio, declined  -plan for serial growth Us, repeat at next visit      9. Carrier of spinal muscular atrophy  Overview:  -screen pos for carrier status  -FOB negative      Other orders  -     Tdap Vaccine Greater Than or Equal To 6yo IM      Labor precautions reviewed  Return in about 2 weeks (around 3/16/2023) for Next f/u with growth Us.    Kim Judge MD

## 2023-03-03 RX ORDER — FERROUS SULFATE 325(65) MG
325 TABLET ORAL 2 TIMES DAILY
Qty: 60 TABLET | Refills: 3 | Status: SHIPPED | OUTPATIENT
Start: 2023-03-03

## 2023-03-03 NOTE — PROGRESS NOTES
Please call patient to let her know her gestational diabetes screen was normal.  She is also a bit anemic now so I have sent in oral iron for her to take twice daily ideally.

## 2023-03-06 PROBLEM — O99.019 MATERNAL ANEMIA IN PREGNANCY, ANTEPARTUM: Status: ACTIVE | Noted: 2023-03-06

## 2023-03-06 NOTE — PROGRESS NOTES
Okay, I called her but she did not answer.  We will plan to repeat CBC, ferritin, and hemoglobin electrophoresis at 32 WGA so we can arrange IV iron if needed.

## 2023-03-16 ENCOUNTER — ROUTINE PRENATAL (OUTPATIENT)
Dept: OBSTETRICS AND GYNECOLOGY | Age: 23
End: 2023-03-16
Payer: COMMERCIAL

## 2023-03-16 VITALS — WEIGHT: 155 LBS | SYSTOLIC BLOOD PRESSURE: 100 MMHG | DIASTOLIC BLOOD PRESSURE: 64 MMHG | BODY MASS INDEX: 25.79 KG/M2

## 2023-03-16 DIAGNOSIS — O99.019 MATERNAL ANEMIA IN PREGNANCY, ANTEPARTUM: ICD-10-CM

## 2023-03-16 DIAGNOSIS — Z3A.30 30 WEEKS GESTATION OF PREGNANCY: Primary | ICD-10-CM

## 2023-03-16 DIAGNOSIS — Z13.89 SCREENING FOR BLOOD OR PROTEIN IN URINE: ICD-10-CM

## 2023-03-16 DIAGNOSIS — Q78.0 OSTEOGENESIS IMPERFECTA TYPE I: ICD-10-CM

## 2023-03-16 DIAGNOSIS — Z14.8 CARRIER OF SPINAL MUSCULAR ATROPHY: ICD-10-CM

## 2023-03-16 DIAGNOSIS — E55.9 VITAMIN D DEFICIENCY: ICD-10-CM

## 2023-03-16 DIAGNOSIS — Z82.79 FAMILY HISTORY OF OSTEOGENESIS IMPERFECTA: ICD-10-CM

## 2023-03-16 LAB
GLUCOSE UR STRIP-MCNC: NEGATIVE MG/DL
PROT UR STRIP-MCNC: NEGATIVE MG/DL

## 2023-03-16 PROCEDURE — 0502F SUBSEQUENT PRENATAL CARE: CPT | Performed by: STUDENT IN AN ORGANIZED HEALTH CARE EDUCATION/TRAINING PROGRAM

## 2023-03-16 NOTE — PROGRESS NOTES
Gianluca Leyva, a 22 y.o.  at 30w1d, presents for OB follow-up.  She is doing well today.  Denies loss of fluid, vaginal bleeding, and contractions.  Endorses fetal movements.    Objective  BP Readings from Last 3 Encounters:   23 100/64   23 112/60   23 110/70      Wt Readings from Last 3 Encounters:   23 70.3 kg (155 lb)   23 67.2 kg (148 lb 3.2 oz)   23 67.4 kg (148 lb 9.6 oz)      Total weight gain this pregnancy: 8.165 kg (18 lb)    General: Awake, alert, no apparent distress  Respiratory: No increased work of breathing  Abdomen: Fundus soft and nontender  Extremity: Nontender, no edema    A/P  Diagnoses and all orders for this visit:    1. 30 weeks gestation of pregnancy (Primary)  Overview:  -PNL: 1hr GTT normal, plan for GBS at 36wga  -Pap: n/a  -Genetics: cfDNA/msAFP neg, pos SMA carrier - see below, anatomy Us normal with MFM  -Imm: RI, mary unk (unsure hx, draw titer with next bld draw), s/p tdap, s/p covid vaccine, rec'd booster, s/p flu shot  -Contraception: discuss at future visit  -Birthplan: discuss at future visit  -Care coordination: accepts blood transfusions, no latex allergy, call schedule reviewed        2. Screening for blood or protein in urine  -     POC Urinalysis Dipstick    3. Vitamin D deficiency  Overview:  -vit D 25 > 19 > 24  -moderately compliant with vit D 49642 IU weekly but now taking gummies more consistently  -managed by Endo, planning to recheck levels in 2mo      4. Family history of osteogenesis imperfecta  Overview:  Patient's mother, three other siblings, and maternal grandmother      5. Maternal anemia in pregnancy, antepartum  Overview:  -Hb 10.8  -Recommended po iron but patient declined b/c struggles to swallow pills  -Plan for CBC, ferritin, and Hb electrophoresis at 32wga so IV iron can be arranged if needed      6. Osteogenesis imperfecta type I  Overview:  -Patient, mother, and her two brothers all with OI, h/o multiple  fractures  -S/p [facility] consult - recs for vaginal delivery unless other obstetric indication for  and CL screening at [facility] office, thus far normal  -[facility] discussed prenatal dx of OI with CVS vs amnio, declined  -plan for serial growth Us, repeat at 34wga      7. Carrier of spinal muscular atrophy  Overview:  -screen pos for carrier status  -FOB negative          Labor precautions reviewed  Return in about 2 weeks (around 3/30/2023) for Next f/u, cancel Us.    Kim Judge MD

## 2023-03-30 ENCOUNTER — ROUTINE PRENATAL (OUTPATIENT)
Dept: OBSTETRICS AND GYNECOLOGY | Age: 23
End: 2023-03-30
Payer: COMMERCIAL

## 2023-03-30 VITALS — SYSTOLIC BLOOD PRESSURE: 104 MMHG | WEIGHT: 156.2 LBS | BODY MASS INDEX: 25.99 KG/M2 | DIASTOLIC BLOOD PRESSURE: 62 MMHG

## 2023-03-30 DIAGNOSIS — E55.9 VITAMIN D DEFICIENCY: ICD-10-CM

## 2023-03-30 DIAGNOSIS — Z82.79 FAMILY HISTORY OF OSTEOGENESIS IMPERFECTA: ICD-10-CM

## 2023-03-30 DIAGNOSIS — Z3A.32 32 WEEKS GESTATION OF PREGNANCY: Primary | ICD-10-CM

## 2023-03-30 DIAGNOSIS — Z13.89 SCREENING FOR BLOOD OR PROTEIN IN URINE: ICD-10-CM

## 2023-03-30 DIAGNOSIS — Q78.0 OSTEOGENESIS IMPERFECTA TYPE I: ICD-10-CM

## 2023-03-30 DIAGNOSIS — O99.019 MATERNAL ANEMIA IN PREGNANCY, ANTEPARTUM: ICD-10-CM

## 2023-03-30 DIAGNOSIS — Z14.8 CARRIER OF SPINAL MUSCULAR ATROPHY: ICD-10-CM

## 2023-03-30 NOTE — PROGRESS NOTES
Gianluca Leyva, a 22 y.o.  at 32w1d, presents for OB follow-up.  She is doing well today.  Denies loss of fluid, vaginal bleeding, and contractions.  Endorses fetal movements.    Objective  BP Readings from Last 3 Encounters:   23 104/62   23 100/64   23 112/60      Wt Readings from Last 3 Encounters:   23 70.9 kg (156 lb 3.2 oz)   23 70.3 kg (155 lb)   23 67.2 kg (148 lb 3.2 oz)      Total weight gain this pregnancy: 8.709 kg (19 lb 3.2 oz)    General: Awake, alert, no apparent distress  Respiratory: No increased work of breathing  Abdomen: Fundus soft and nontender  Extremity: Nontender, no edema    A/P  Diagnoses and all orders for this visit:    1. 32 weeks gestation of pregnancy (Primary)  Overview:  -PNL: 1hr GTT normal, plan for GBS at 36wga  -Pap: n/a  -Genetics: cfDNA/msAFP neg, pos SMA carrier - see below, anatomy Us normal with MFM  -Imm: RI, mary unk (unsure hx, draw titer with next bld draw), s/p tdap, s/p covid vaccine, rec'd booster, s/p flu shot  -Contraception: h/o OCPs and patch, liked okay but wants Nexplanon postpartum  -Birthplan: boy, circ, epidural, breast  -Care coordination: accepts blood transfusions, no latex allergy, call schedule reviewed      Orders:  -     POC Urinalysis Dipstick  -     CBC (No Diff)  -     Ferritin  -     Hemoglobinopathy Fractionation [place]    2. Screening for blood or protein in urine  -     POC Urinalysis Dipstick    3. Vitamin D deficiency  Overview:  -vit D 25 > 19 > 24  -moderately compliant with vit D 51603 IU weekly but now taking gummies more consistently  -managed by Endo, planning to recheck levels in 2mo      4. Family history of osteogenesis imperfecta  Overview:  Patient's mother, three other siblings, and maternal grandmother      5. Maternal anemia in pregnancy, antepartum  Overview:  -Hb 10.8  -Recommended po iron but patient declined b/c struggles to swallow pills, now taking more consistently  -CBC,  ferritin, and Hb electrophoresis today so IV iron can be arranged if needed    Orders:  -     CBC (No Diff)  -     Ferritin  -     Hemoglobinopathy Fractionation [place]    6. Osteogenesis imperfecta type I  Overview:  -Patient, mother, and her two brothers all with OI, h/o multiple fractures  -S/p [facility] consult - recs for vaginal delivery unless other obstetric indication for  and CL screening at [facility] office, thus far normal  -[facility] discussed prenatal dx of OI with CVS vs amnio, declined  -plan for serial growth Us, repeat at next visit      7. Carrier of spinal muscular atrophy  Overview:  -screen pos for carrier status  -FOB negative          Labor precautions reviewed  Return in about 2 weeks (around 2023) for Next f/u with growth Us.    Kim Judge MD

## 2023-03-31 ENCOUNTER — TELEPHONE (OUTPATIENT)
Dept: OBSTETRICS AND GYNECOLOGY | Age: 23
End: 2023-03-31
Payer: COMMERCIAL

## 2023-03-31 DIAGNOSIS — Z3A.32 32 WEEKS GESTATION OF PREGNANCY: ICD-10-CM

## 2023-03-31 DIAGNOSIS — O99.019 MATERNAL ANEMIA IN PREGNANCY, ANTEPARTUM: Primary | ICD-10-CM

## 2023-03-31 LAB
ERYTHROCYTE [DISTWIDTH] IN BLOOD BY AUTOMATED COUNT: 12.5 % (ref 12.3–15.4)
FERRITIN SERPL-MCNC: 13 NG/ML (ref 13–150)
HCT VFR BLD AUTO: 29.7 % (ref 34–46.6)
HGB A MFR BLD ELPH: 97.6 % (ref 96.4–98.8)
HGB A2 MFR BLD ELPH: 2.4 % (ref 1.8–3.2)
HGB BLD-MCNC: 10.3 G/DL (ref 12–15.9)
HGB F MFR BLD ELPH: 0 % (ref 0–2)
HGB FRACT BLD-IMP: NORMAL
HGB S MFR BLD ELPH: 0 %
MCH RBC QN AUTO: 31.7 PG (ref 26.6–33)
MCHC RBC AUTO-ENTMCNC: 34.7 G/DL (ref 31.5–35.7)
MCV RBC AUTO: 91.4 FL (ref 79–97)
PLATELET # BLD AUTO: 273 10*3/MM3 (ref 140–450)
RBC # BLD AUTO: 3.25 10*6/MM3 (ref 3.77–5.28)
WBC # BLD AUTO: 6.27 10*3/MM3 (ref 3.4–10.8)

## 2023-03-31 RX ORDER — DIPHENHYDRAMINE HYDROCHLORIDE 50 MG/ML
25 INJECTION INTRAMUSCULAR; INTRAVENOUS ONCE
Status: CANCELLED | OUTPATIENT
Start: 2023-04-10

## 2023-03-31 RX ORDER — SODIUM CHLORIDE 9 MG/ML
250 INJECTION, SOLUTION INTRAVENOUS ONCE
Status: CANCELLED | OUTPATIENT
Start: 2023-04-10

## 2023-03-31 RX ORDER — DIPHENHYDRAMINE HCL 25 MG
25 CAPSULE ORAL ONCE
Status: CANCELLED | OUTPATIENT
Start: 2023-04-10

## 2023-03-31 RX ORDER — FAMOTIDINE 20 MG/1
20 TABLET, FILM COATED ORAL ONCE
Status: CANCELLED | OUTPATIENT
Start: 2023-04-10

## 2023-03-31 RX ORDER — ACETAMINOPHEN 325 MG/1
650 TABLET ORAL ONCE
Status: CANCELLED | OUTPATIENT
Start: 2023-04-10

## 2023-03-31 RX ORDER — DIPHENHYDRAMINE HYDROCHLORIDE 50 MG/ML
50 INJECTION INTRAMUSCULAR; INTRAVENOUS AS NEEDED
Status: CANCELLED | OUTPATIENT
Start: 2023-04-10

## 2023-03-31 RX ORDER — FAMOTIDINE 10 MG/ML
20 INJECTION, SOLUTION INTRAVENOUS AS NEEDED
Status: CANCELLED | OUTPATIENT
Start: 2023-04-10

## 2023-03-31 RX ORDER — FAMOTIDINE 10 MG/ML
20 INJECTION, SOLUTION INTRAVENOUS ONCE
Status: CANCELLED | OUTPATIENT
Start: 2023-04-10

## 2023-03-31 NOTE — TELEPHONE ENCOUNTER
Dr. Judge,    Can you place the ACU order for me. Crissy in scheduling wont let me schedule without it. How you normally place orders you should see Ambulatory referral to ACU.    Thanks,    Brandi

## 2023-04-03 DIAGNOSIS — O99.019 MATERNAL ANEMIA IN PREGNANCY, ANTEPARTUM: Primary | ICD-10-CM

## 2023-04-05 NOTE — PROGRESS NOTES
Dr. Jimenes returned call and verified that labs are ordered to be drawn 4/1023 or next appointment.

## 2023-04-06 ENCOUNTER — HOSPITAL ENCOUNTER (OUTPATIENT)
Dept: INFUSION THERAPY | Facility: HOSPITAL | Age: 23
Discharge: HOME | End: 2023-04-06
Admitting: STUDENT IN AN ORGANIZED HEALTH CARE EDUCATION/TRAINING PROGRAM
Payer: COMMERCIAL

## 2023-04-06 VITALS
TEMPERATURE: 97.7 F | DIASTOLIC BLOOD PRESSURE: 69 MMHG | HEART RATE: 84 BPM | SYSTOLIC BLOOD PRESSURE: 107 MMHG | OXYGEN SATURATION: 99 % | RESPIRATION RATE: 20 BRPM

## 2023-04-06 DIAGNOSIS — O99.019 MATERNAL ANEMIA IN PREGNANCY, ANTEPARTUM: Primary | ICD-10-CM

## 2023-04-06 DIAGNOSIS — Z3A.32 32 WEEKS GESTATION OF PREGNANCY: ICD-10-CM

## 2023-04-06 PROCEDURE — 25010000002 IRON SUCROSE PER 1 MG: Performed by: STUDENT IN AN ORGANIZED HEALTH CARE EDUCATION/TRAINING PROGRAM

## 2023-04-06 PROCEDURE — 96366 THER/PROPH/DIAG IV INF ADDON: CPT

## 2023-04-06 PROCEDURE — 96365 THER/PROPH/DIAG IV INF INIT: CPT

## 2023-04-06 PROCEDURE — 63710000001 DIPHENHYDRAMINE PER 50 MG: Performed by: STUDENT IN AN ORGANIZED HEALTH CARE EDUCATION/TRAINING PROGRAM

## 2023-04-06 RX ORDER — FAMOTIDINE 10 MG/ML
20 INJECTION, SOLUTION INTRAVENOUS AS NEEDED
Status: DISCONTINUED | OUTPATIENT
Start: 2023-04-06 | End: 2023-04-08 | Stop reason: HOSPADM

## 2023-04-06 RX ORDER — FAMOTIDINE 10 MG/ML
20 INJECTION, SOLUTION INTRAVENOUS ONCE
Status: CANCELLED | OUTPATIENT
Start: 2023-04-07

## 2023-04-06 RX ORDER — DIPHENHYDRAMINE HCL 25 MG
25 CAPSULE ORAL ONCE
Status: CANCELLED | OUTPATIENT
Start: 2023-04-07

## 2023-04-06 RX ORDER — SODIUM CHLORIDE 9 MG/ML
250 INJECTION, SOLUTION INTRAVENOUS ONCE
Status: CANCELLED | OUTPATIENT
Start: 2023-04-07

## 2023-04-06 RX ORDER — ACETAMINOPHEN 325 MG/1
650 TABLET ORAL ONCE
Status: COMPLETED | OUTPATIENT
Start: 2023-04-06 | End: 2023-04-06

## 2023-04-06 RX ORDER — DIPHENHYDRAMINE HCL 25 MG
25 CAPSULE ORAL ONCE
Status: COMPLETED | OUTPATIENT
Start: 2023-04-06 | End: 2023-04-06

## 2023-04-06 RX ORDER — ACETAMINOPHEN 325 MG/1
650 TABLET ORAL ONCE
Status: CANCELLED | OUTPATIENT
Start: 2023-04-07

## 2023-04-06 RX ORDER — FAMOTIDINE 10 MG/ML
20 INJECTION, SOLUTION INTRAVENOUS AS NEEDED
Status: CANCELLED | OUTPATIENT
Start: 2023-04-07

## 2023-04-06 RX ORDER — SODIUM CHLORIDE 9 MG/ML
250 INJECTION, SOLUTION INTRAVENOUS ONCE
Status: DISCONTINUED | OUTPATIENT
Start: 2023-04-06 | End: 2023-04-08 | Stop reason: HOSPADM

## 2023-04-06 RX ORDER — DIPHENHYDRAMINE HYDROCHLORIDE 50 MG/ML
25 INJECTION INTRAMUSCULAR; INTRAVENOUS ONCE
Status: CANCELLED | OUTPATIENT
Start: 2023-04-07

## 2023-04-06 RX ORDER — FAMOTIDINE 20 MG/1
20 TABLET, FILM COATED ORAL ONCE
Status: COMPLETED | OUTPATIENT
Start: 2023-04-06 | End: 2023-04-06

## 2023-04-06 RX ORDER — FAMOTIDINE 20 MG/1
20 TABLET, FILM COATED ORAL ONCE
Status: CANCELLED | OUTPATIENT
Start: 2023-04-07

## 2023-04-06 RX ORDER — DIPHENHYDRAMINE HYDROCHLORIDE 50 MG/ML
50 INJECTION INTRAMUSCULAR; INTRAVENOUS AS NEEDED
Status: DISCONTINUED | OUTPATIENT
Start: 2023-04-06 | End: 2023-04-08 | Stop reason: HOSPADM

## 2023-04-06 RX ORDER — DIPHENHYDRAMINE HYDROCHLORIDE 50 MG/ML
50 INJECTION INTRAMUSCULAR; INTRAVENOUS AS NEEDED
Status: CANCELLED | OUTPATIENT
Start: 2023-04-07

## 2023-04-06 RX ADMIN — ACETAMINOPHEN 650 MG: 325 TABLET ORAL at 13:10

## 2023-04-06 RX ADMIN — IRON SUCROSE 300 MG: 20 INJECTION, SOLUTION INTRAVENOUS at 13:35

## 2023-04-06 RX ADMIN — DIPHENHYDRAMINE HYDROCHLORIDE 25 MG: 25 CAPSULE ORAL at 13:10

## 2023-04-06 RX ADMIN — FAMOTIDINE 20 MG: 20 TABLET, FILM COATED ORAL at 13:10

## 2023-04-13 ENCOUNTER — HOSPITAL ENCOUNTER (OUTPATIENT)
Dept: INFUSION THERAPY | Facility: HOSPITAL | Age: 23
Discharge: HOME | End: 2023-04-13
Admitting: STUDENT IN AN ORGANIZED HEALTH CARE EDUCATION/TRAINING PROGRAM
Payer: COMMERCIAL

## 2023-04-13 ENCOUNTER — ROUTINE PRENATAL (OUTPATIENT)
Dept: OBSTETRICS AND GYNECOLOGY | Age: 23
End: 2023-04-13
Payer: COMMERCIAL

## 2023-04-13 ENCOUNTER — TELEPHONE (OUTPATIENT)
Dept: OBSTETRICS AND GYNECOLOGY | Age: 23
End: 2023-04-13

## 2023-04-13 VITALS
OXYGEN SATURATION: 94 % | DIASTOLIC BLOOD PRESSURE: 56 MMHG | TEMPERATURE: 97.1 F | RESPIRATION RATE: 16 BRPM | SYSTOLIC BLOOD PRESSURE: 99 MMHG | HEART RATE: 80 BPM

## 2023-04-13 VITALS — DIASTOLIC BLOOD PRESSURE: 60 MMHG | WEIGHT: 159.4 LBS | SYSTOLIC BLOOD PRESSURE: 102 MMHG | BODY MASS INDEX: 26.53 KG/M2

## 2023-04-13 DIAGNOSIS — Z82.79 FAMILY HISTORY OF OSTEOGENESIS IMPERFECTA: ICD-10-CM

## 2023-04-13 DIAGNOSIS — Z3A.34 34 WEEKS GESTATION OF PREGNANCY: Primary | ICD-10-CM

## 2023-04-13 DIAGNOSIS — E55.9 VITAMIN D DEFICIENCY: ICD-10-CM

## 2023-04-13 DIAGNOSIS — Z3A.32 32 WEEKS GESTATION OF PREGNANCY: ICD-10-CM

## 2023-04-13 DIAGNOSIS — Z14.8 CARRIER OF SPINAL MUSCULAR ATROPHY: ICD-10-CM

## 2023-04-13 DIAGNOSIS — Q78.0 OSTEOGENESIS IMPERFECTA TYPE I: ICD-10-CM

## 2023-04-13 DIAGNOSIS — O99.019 MATERNAL ANEMIA IN PREGNANCY, ANTEPARTUM: ICD-10-CM

## 2023-04-13 DIAGNOSIS — O99.019 MATERNAL ANEMIA IN PREGNANCY, ANTEPARTUM: Primary | ICD-10-CM

## 2023-04-13 LAB
GLUCOSE UR STRIP-MCNC: NEGATIVE MG/DL
PROT UR STRIP-MCNC: NEGATIVE MG/DL

## 2023-04-13 PROCEDURE — 0502F SUBSEQUENT PRENATAL CARE: CPT | Performed by: STUDENT IN AN ORGANIZED HEALTH CARE EDUCATION/TRAINING PROGRAM

## 2023-04-13 PROCEDURE — 96375 TX/PRO/DX INJ NEW DRUG ADDON: CPT

## 2023-04-13 PROCEDURE — 96374 THER/PROPH/DIAG INJ IV PUSH: CPT

## 2023-04-13 PROCEDURE — 96366 THER/PROPH/DIAG IV INF ADDON: CPT

## 2023-04-13 PROCEDURE — 96365 THER/PROPH/DIAG IV INF INIT: CPT

## 2023-04-13 PROCEDURE — 25010000002 DIPHENHYDRAMINE PER 50 MG: Performed by: STUDENT IN AN ORGANIZED HEALTH CARE EDUCATION/TRAINING PROGRAM

## 2023-04-13 PROCEDURE — 25010000002 IRON SUCROSE PER 1 MG: Performed by: STUDENT IN AN ORGANIZED HEALTH CARE EDUCATION/TRAINING PROGRAM

## 2023-04-13 PROCEDURE — 81002 URINALYSIS NONAUTO W/O SCOPE: CPT | Performed by: STUDENT IN AN ORGANIZED HEALTH CARE EDUCATION/TRAINING PROGRAM

## 2023-04-13 RX ORDER — DIPHENHYDRAMINE HYDROCHLORIDE 50 MG/ML
50 INJECTION INTRAMUSCULAR; INTRAVENOUS AS NEEDED
Status: CANCELLED | OUTPATIENT
Start: 2023-04-14

## 2023-04-13 RX ORDER — FAMOTIDINE 20 MG/1
20 TABLET, FILM COATED ORAL ONCE
Status: COMPLETED | OUTPATIENT
Start: 2023-04-13 | End: 2023-04-13

## 2023-04-13 RX ORDER — FAMOTIDINE 10 MG/ML
20 INJECTION, SOLUTION INTRAVENOUS ONCE
OUTPATIENT
Start: 2023-04-14

## 2023-04-13 RX ORDER — DIPHENHYDRAMINE HYDROCHLORIDE 50 MG/ML
25 INJECTION INTRAMUSCULAR; INTRAVENOUS ONCE
Status: COMPLETED | OUTPATIENT
Start: 2023-04-13 | End: 2023-04-13

## 2023-04-13 RX ORDER — DIPHENHYDRAMINE HCL 25 MG
25 CAPSULE ORAL ONCE
Status: DISCONTINUED | OUTPATIENT
Start: 2023-04-13 | End: 2023-04-15 | Stop reason: HOSPADM

## 2023-04-13 RX ORDER — ACETAMINOPHEN 325 MG/1
650 TABLET ORAL ONCE
Status: COMPLETED | OUTPATIENT
Start: 2023-04-13 | End: 2023-04-13

## 2023-04-13 RX ORDER — SODIUM CHLORIDE 9 MG/ML
250 INJECTION, SOLUTION INTRAVENOUS ONCE
Status: CANCELLED | OUTPATIENT
Start: 2023-04-14

## 2023-04-13 RX ORDER — FAMOTIDINE 10 MG/ML
20 INJECTION, SOLUTION INTRAVENOUS AS NEEDED
Status: CANCELLED | OUTPATIENT
Start: 2023-04-14

## 2023-04-13 RX ORDER — FAMOTIDINE 20 MG/1
20 TABLET, FILM COATED ORAL ONCE
Status: CANCELLED | OUTPATIENT
Start: 2023-04-14

## 2023-04-13 RX ORDER — SODIUM CHLORIDE 9 MG/ML
250 INJECTION, SOLUTION INTRAVENOUS ONCE
Status: DISCONTINUED | OUTPATIENT
Start: 2023-04-13 | End: 2023-04-15 | Stop reason: HOSPADM

## 2023-04-13 RX ORDER — ACETAMINOPHEN 325 MG/1
650 TABLET ORAL ONCE
Status: CANCELLED | OUTPATIENT
Start: 2023-04-14

## 2023-04-13 RX ORDER — DIPHENHYDRAMINE HYDROCHLORIDE 50 MG/ML
25 INJECTION INTRAMUSCULAR; INTRAVENOUS ONCE
Status: CANCELLED | OUTPATIENT
Start: 2023-04-14

## 2023-04-13 RX ORDER — DIPHENHYDRAMINE HCL 25 MG
25 CAPSULE ORAL ONCE
Status: CANCELLED | OUTPATIENT
Start: 2023-04-14

## 2023-04-13 RX ADMIN — IRON SUCROSE 300 MG: 20 INJECTION, SOLUTION INTRAVENOUS at 12:23

## 2023-04-13 RX ADMIN — FAMOTIDINE 20 MG: 20 TABLET, FILM COATED ORAL at 11:48

## 2023-04-13 RX ADMIN — ACETAMINOPHEN 650 MG: 325 TABLET ORAL at 11:48

## 2023-04-13 RX ADMIN — DIPHENHYDRAMINE HYDROCHLORIDE 25 MG: 50 INJECTION, SOLUTION INTRAMUSCULAR; INTRAVENOUS at 11:59

## 2023-04-13 NOTE — PROGRESS NOTES
Pt tolerated infusion with no S/S of reaction or patient complaints.  DC per ambulation after completion of visit.
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
## 2023-04-13 NOTE — PROGRESS NOTES
Gianluca Leyva, a 22 y.o.  at 34w1d, presents for OB follow-up.  She is doing well today.  Denies loss of fluid, vaginal bleeding, and contractions.  Endorses fetal movements.    Objective  BP Readings from Last 3 Encounters:   23 102/60   23 107/69   23 104/62      Wt Readings from Last 3 Encounters:   23 72.3 kg (159 lb 6.4 oz)   23 70.9 kg (156 lb 3.2 oz)   23 70.3 kg (155 lb)      Total weight gain this pregnancy: 10.2 kg (22 lb 6.4 oz)    General: Awake, alert, no apparent distress  Respiratory: No increased work of breathing  Abdomen: Fundus soft and nontender  Extremity: Nontender, no edema    A/P  Diagnoses and all orders for this visit:    1. 34 weeks gestation of pregnancy (Primary)  Overview:  -PNL: 1hr GTT normal, plan for GBS/varicella at 36wga  -Pap: n/a  -Genetics: cfDNA/msAFP neg, pos SMA carrier - see below, anatomy Us normal with MFM  -Imm: RI, mary unk (unsure hx, draw titer with next bld draw), s/p tdap, s/p covid vaccine, rec'd booster, s/p flu shot  -Contraception: h/o OCPs and patch, liked okay but wants Nexplanon postpartum  -Birthplan: boy, circ, epidural, breast  -Care coordination: accepts blood transfusions, no latex allergy, call schedule reviewed      Orders:  -     POC Urinalysis Dipstick    2. Vitamin D deficiency  Overview:  -vit D 25 > 19 > 24  -moderately compliant with vit D 99827 IU weekly but now taking gummies more consistently  -managed by Endo, planning to recheck levels in 2mo      3. Family history of osteogenesis imperfecta  Overview:  Patient's mother, three other siblings, and maternal grandmother      4. Maternal anemia in pregnancy, antepartum  Overview:  -Hb 10.8 > 10.3, ferritin 13, normal Hb electro  -Recommended po iron but patient declined b/c struggles to swallow pills, now taking more consistently  -s/p IV iron x 1, two more doses, one today      5. Osteogenesis imperfecta type I  Overview:  -Patient, mother, and her two  brothers all with OI, h/o multiple fractures  -S/p [facility] consult - recs for vaginal delivery unless other obstetric indication for  and CL screening at [facility] office, thus far normal  -[facility] discussed prenatal dx of OI with CVS vs amnio, declined  -plan for serial growth Us, normal at 34wga      6. Carrier of spinal muscular atrophy  Overview:  -screen pos for carrier status  -FOB negative          Labor precautions reviewed  Return in about 2 weeks (around 2023) for Next f/u.    Kim Judge MD

## 2023-04-20 ENCOUNTER — HOSPITAL ENCOUNTER (OUTPATIENT)
Dept: INFUSION THERAPY | Facility: HOSPITAL | Age: 23
Discharge: HOME | End: 2023-04-20
Admitting: STUDENT IN AN ORGANIZED HEALTH CARE EDUCATION/TRAINING PROGRAM
Payer: COMMERCIAL

## 2023-04-20 ENCOUNTER — OFFICE VISIT (OUTPATIENT)
Dept: ENDOCRINOLOGY | Age: 23
End: 2023-04-20
Payer: COMMERCIAL

## 2023-04-20 VITALS
SYSTOLIC BLOOD PRESSURE: 116 MMHG | DIASTOLIC BLOOD PRESSURE: 60 MMHG | RESPIRATION RATE: 20 BRPM | TEMPERATURE: 98.4 F | HEART RATE: 80 BPM | OXYGEN SATURATION: 100 %

## 2023-04-20 VITALS
WEIGHT: 162.4 LBS | OXYGEN SATURATION: 99 % | HEART RATE: 82 BPM | TEMPERATURE: 97.3 F | HEIGHT: 65 IN | BODY MASS INDEX: 27.06 KG/M2 | DIASTOLIC BLOOD PRESSURE: 70 MMHG | SYSTOLIC BLOOD PRESSURE: 118 MMHG

## 2023-04-20 DIAGNOSIS — O99.019 MATERNAL ANEMIA IN PREGNANCY, ANTEPARTUM: Primary | ICD-10-CM

## 2023-04-20 DIAGNOSIS — E55.9 VITAMIN D DEFICIENCY: Primary | ICD-10-CM

## 2023-04-20 DIAGNOSIS — Z3A.32 32 WEEKS GESTATION OF PREGNANCY: ICD-10-CM

## 2023-04-20 PROCEDURE — 25010000002 IRON SUCROSE PER 1 MG: Performed by: STUDENT IN AN ORGANIZED HEALTH CARE EDUCATION/TRAINING PROGRAM

## 2023-04-20 PROCEDURE — 63710000001 DIPHENHYDRAMINE PER 50 MG: Performed by: STUDENT IN AN ORGANIZED HEALTH CARE EDUCATION/TRAINING PROGRAM

## 2023-04-20 PROCEDURE — 96366 THER/PROPH/DIAG IV INF ADDON: CPT

## 2023-04-20 PROCEDURE — 96365 THER/PROPH/DIAG IV INF INIT: CPT

## 2023-04-20 RX ORDER — DIPHENHYDRAMINE HYDROCHLORIDE 50 MG/ML
25 INJECTION INTRAMUSCULAR; INTRAVENOUS ONCE
Status: DISCONTINUED | OUTPATIENT
Start: 2023-04-20 | End: 2023-04-22 | Stop reason: HOSPADM

## 2023-04-20 RX ORDER — FAMOTIDINE 20 MG/1
20 TABLET, FILM COATED ORAL ONCE
Status: COMPLETED | OUTPATIENT
Start: 2023-04-20 | End: 2023-04-20

## 2023-04-20 RX ORDER — SODIUM CHLORIDE 9 MG/ML
250 INJECTION, SOLUTION INTRAVENOUS ONCE
Status: DISCONTINUED | OUTPATIENT
Start: 2023-04-20 | End: 2023-04-22 | Stop reason: HOSPADM

## 2023-04-20 RX ORDER — FAMOTIDINE 10 MG/ML
20 INJECTION, SOLUTION INTRAVENOUS AS NEEDED
Status: DISCONTINUED | OUTPATIENT
Start: 2023-04-20 | End: 2023-04-22 | Stop reason: HOSPADM

## 2023-04-20 RX ORDER — ACETAMINOPHEN 325 MG/1
650 TABLET ORAL ONCE
Status: CANCELLED | OUTPATIENT
Start: 2023-04-21

## 2023-04-20 RX ORDER — DIPHENHYDRAMINE HYDROCHLORIDE 50 MG/ML
50 INJECTION INTRAMUSCULAR; INTRAVENOUS AS NEEDED
Status: DISCONTINUED | OUTPATIENT
Start: 2023-04-20 | End: 2023-04-22 | Stop reason: HOSPADM

## 2023-04-20 RX ORDER — DIPHENHYDRAMINE HCL 25 MG
25 CAPSULE ORAL ONCE
Status: COMPLETED | OUTPATIENT
Start: 2023-04-20 | End: 2023-04-20

## 2023-04-20 RX ORDER — DIPHENHYDRAMINE HCL 25 MG
25 CAPSULE ORAL ONCE
Status: CANCELLED | OUTPATIENT
Start: 2023-04-21

## 2023-04-20 RX ORDER — ACETAMINOPHEN 325 MG/1
650 TABLET ORAL ONCE
Status: COMPLETED | OUTPATIENT
Start: 2023-04-20 | End: 2023-04-20

## 2023-04-20 RX ORDER — FAMOTIDINE 20 MG/1
20 TABLET, FILM COATED ORAL ONCE
Status: CANCELLED | OUTPATIENT
Start: 2023-04-21

## 2023-04-20 RX ORDER — DIPHENHYDRAMINE HYDROCHLORIDE 50 MG/ML
25 INJECTION INTRAMUSCULAR; INTRAVENOUS ONCE
OUTPATIENT
Start: 2023-04-21

## 2023-04-20 RX ORDER — FAMOTIDINE 10 MG/ML
20 INJECTION, SOLUTION INTRAVENOUS AS NEEDED
OUTPATIENT
Start: 2023-04-21

## 2023-04-20 RX ORDER — FAMOTIDINE 10 MG/ML
20 INJECTION, SOLUTION INTRAVENOUS ONCE
OUTPATIENT
Start: 2023-04-21

## 2023-04-20 RX ORDER — DIPHENHYDRAMINE HYDROCHLORIDE 50 MG/ML
50 INJECTION INTRAMUSCULAR; INTRAVENOUS AS NEEDED
OUTPATIENT
Start: 2023-04-21

## 2023-04-20 RX ORDER — SODIUM CHLORIDE 9 MG/ML
250 INJECTION, SOLUTION INTRAVENOUS ONCE
Status: CANCELLED | OUTPATIENT
Start: 2023-04-21

## 2023-04-20 RX ADMIN — ACETAMINOPHEN 650 MG: 325 TABLET, FILM COATED ORAL at 10:23

## 2023-04-20 RX ADMIN — DIPHENHYDRAMINE HYDROCHLORIDE 25 MG: 25 CAPSULE ORAL at 10:26

## 2023-04-20 RX ADMIN — IRON SUCROSE 300 MG: 20 INJECTION, SOLUTION INTRAVENOUS at 10:47

## 2023-04-20 RX ADMIN — FAMOTIDINE 20 MG: 20 TABLET, FILM COATED ORAL at 10:24

## 2023-04-20 NOTE — PROGRESS NOTES
New Patient      Chief Complaint    Chief Complaint   Patient presents with   • Vitamin D Deficiency        HPI:   Gianluca Leyva is a 22 y.o. female usually seen in the Endocrinology clinic for consultative follow up and management of a low vitamin D.  We last saw her on February 20, 2023.  At the time she was 23 weeks pregnant and was found with a low vitamin D of 19.1 ng/mL on December 27, 2022 compared to 25.2 ng/mL before that on November 2, 2022.  She had been given a prescription for vitamin D as 50,000 international units every week but she could not tolerate it.  Instead she preferred to take it as vitamin D Gummies and she was taking 1 gummy 2 times daily.  We did encourage her to take 2 Gummies 2 times daily and she was going to do that with the 2 heavy meals of her day which are breakfast and lunch.  She returns for follow-up and is doing well.  She is now 35 weeks pregnant.  Her expected date of delivery is May 24, 2023.  She did not have any complaints or any other concerns today.    Past Medical History:   Diagnosis Date   • Anemia in pregnancy    • Osteogenesis imperfecta           ROS:  Pertinent to this visit, only as mentioned above.  The rest was negative.    Social History     Socioeconomic History   • Marital status: Single   Tobacco Use   • Passive exposure: Never   Vaping Use   • Vaping Use: Never used   Substance and Sexual Activity   • Drug use: Never   • Sexual activity: Yes     Partners: Male     Birth control/protection: None       Physical Exam:  GENERAL: She looked well.  HEENT: Normal examination.  LUNGS: Clear to auscultation bilaterally  CVS: RRR  EXTREMITIES: Normal examination.    Assessment:  1.  Vitamin D insufficiency on replacement in a patient in the third trimester pregnancy.    Diagnoses and all orders for this visit:    1. Vitamin D deficiency (Primary)  Overview:  -vit D 25 > 19 > 24  -moderately compliant with vit D 10086 IU weekly but now taking gummies more  consistently  -managed by [facility], planning to recheck levels in 2mo    Orders:  -     Vitamin D,25-Hydroxy    Recommendations:  1.  We reviewed with Ms. Leyva the history of the low vitamin D and her current vitamin D intake.  2.  We recommend to recheck her vitamin D today to see where it is at.  3.  We did encourage her to continue with vitamin D intake as she is doing indefinitely.  4.  She will follow-up with her primary care physician and only return to endocrinology as clearly indicated.  5.  We gave her the opportunity to ask questions but she did not have any.  She also did not have any other concerns today.

## 2023-04-21 ENCOUNTER — PATIENT MESSAGE (OUTPATIENT)
Dept: OBSTETRICS AND GYNECOLOGY | Age: 23
End: 2023-04-21
Payer: COMMERCIAL

## 2023-04-21 LAB — 25(OH)D3+25(OH)D2 SERPL-MCNC: 31.2 NG/ML (ref 30–100)

## 2023-04-21 NOTE — PROGRESS NOTES
"Patient to ACU at 0834 with complaints of LAC \"tenderness and soreness\" from iron infusion yesterday. This RN observed arm, slight redness noted, outline marked with Sharpie; patient given warm blanket to apply to arm. Patient returned to ACU at 1238 , no change noted in IV site. Patient instructed to follow-up with MD. Patient verbalized understanding.  "

## 2023-04-21 NOTE — TELEPHONE ENCOUNTER
From: Gianluca Leyva  To: Kim Judge  Sent: 4/21/2023 12:51 PM EDT  Subject: Iron Infusion complications     Good afternoon ,    I had completed my third fusion for 4/20 and during the infusion experienced a little discomfort from the site. I notified the nurse and as I’m here working 4/21 I had visited the center for them to evaluate the site and the RN has been monitoring it and she assumed it would be from iv complication. She advised I reach out to you and see what direction I should take next , I just want to make sure it did not get infiltrated.

## 2023-04-26 ENCOUNTER — ROUTINE PRENATAL (OUTPATIENT)
Dept: OBSTETRICS AND GYNECOLOGY | Age: 23
End: 2023-04-26
Payer: COMMERCIAL

## 2023-04-26 VITALS — SYSTOLIC BLOOD PRESSURE: 112 MMHG | BODY MASS INDEX: 26.56 KG/M2 | DIASTOLIC BLOOD PRESSURE: 62 MMHG | WEIGHT: 159.6 LBS

## 2023-04-26 DIAGNOSIS — Z14.8 CARRIER OF SPINAL MUSCULAR ATROPHY: ICD-10-CM

## 2023-04-26 DIAGNOSIS — Z36.85 SCREENING, ANTENATAL, FOR STREPTOCOCCUS B: ICD-10-CM

## 2023-04-26 DIAGNOSIS — E55.9 VITAMIN D DEFICIENCY: ICD-10-CM

## 2023-04-26 DIAGNOSIS — Z3A.36 36 WEEKS GESTATION OF PREGNANCY: Primary | ICD-10-CM

## 2023-04-26 DIAGNOSIS — O99.019 MATERNAL ANEMIA IN PREGNANCY, ANTEPARTUM: ICD-10-CM

## 2023-04-26 DIAGNOSIS — Q78.0 OSTEOGENESIS IMPERFECTA TYPE I: ICD-10-CM

## 2023-04-26 DIAGNOSIS — Z82.79 FAMILY HISTORY OF OSTEOGENESIS IMPERFECTA: ICD-10-CM

## 2023-04-26 DIAGNOSIS — Z13.89 SCREENING FOR BLOOD OR PROTEIN IN URINE: ICD-10-CM

## 2023-04-26 NOTE — PROGRESS NOTES
Gianluca Leyva, a 22 y.o.  at 36w0d, presents for OB follow-up.  She is doing well today.  Denies loss of fluid, vaginal bleeding, and contractions.  Endorses fetal movements.    Objective  BP Readings from Last 3 Encounters:   23 112/62   23 116/60   23 118/70      Wt Readings from Last 3 Encounters:   23 72.4 kg (159 lb 9.6 oz)   23 73.7 kg (162 lb 6.4 oz)   23 72.3 kg (159 lb 6.4 oz)      Total weight gain this pregnancy: 10.3 kg (22 lb 9.6 oz)    General: Awake, alert, no apparent distress  Respiratory: No increased work of breathing  Abdomen: Fundus soft and nontender  Extremity: Nontender, no edema    A/P  Diagnoses and all orders for this visit:    1. 36 weeks gestation of pregnancy (Primary)  Overview:  -PNL: 1hr GTT normal, plan for GBS/varicella today  -Pap: n/a  -Genetics: cfDNA/msAFP neg, pos SMA carrier - see below, anatomy Us normal with MFM  -Imm: RI, mary unk (unsure hx, draw titer today), s/p tdap, s/p covid vaccine, rec'd booster, s/p flu shot  -Contraception: h/o OCPs and patch, liked okay but wants Nexplanon postpartum  -Birthplan: boy, circ, epidural, breast  -Care coordination: accepts blood transfusions, no latex allergy, call schedule reviewed      Orders:  -     Strep B Screen - Swab, Vaginal/Rectum  -     POC Urinalysis Dipstick  -     Varicella Zoster Antibody, IgG  -     CBC (No Diff)  -     Ferritin    2. Screening for blood or protein in urine  -     POC Urinalysis Dipstick    3. Screening, , for Streptococcus B  -     Strep B Screen - Swab, Vaginal/Rectum    4. Vitamin D deficiency  Overview:  -vit D improved to 31  -moderately compliant with vit D 65811 IU weekly but now taking gummies more consistently  -managed by Endo      5. Family history of osteogenesis imperfecta  Overview:  Patient's mother, three other siblings, and maternal grandmother      6. Maternal anemia in pregnancy, antepartum  Overview:  -Hb 10.8 > 10.3, ferritin 13,  normal Hb electro  -Recommended po iron but patient declined b/c struggles to swallow pills, now taking more consistently  -s/p IV iron x 3  -Repeat CBC and ferritin today    Orders:  -     CBC (No Diff)  -     Ferritin    7. Osteogenesis imperfecta type I  Overview:  -Patient, mother, and her two brothers all with OI, h/o multiple fractures  -S/p MFM consult - recs for vaginal delivery unless other obstetric indication for , s/p normal CL screening  -MFM discussed prenatal dx of OI with CVS vs amnio, declined  -plan for serial growth Us, normal at 34wga, repeat at 38wga      8. Carrier of spinal muscular atrophy  Overview:  -screen pos for carrier status  -FOB negative          Labor precautions reviewed  Return in about 1 week (around 5/3/2023) for Next f/u.    Kim Judge MD

## 2023-04-27 LAB
ERYTHROCYTE [DISTWIDTH] IN BLOOD BY AUTOMATED COUNT: 13.9 % (ref 11.7–15.4)
FERRITIN SERPL-MCNC: 297 NG/ML (ref 15–150)
HCT VFR BLD AUTO: 33.6 % (ref 34–46.6)
HGB BLD-MCNC: 11.4 G/DL (ref 11.1–15.9)
MCH RBC QN AUTO: 31.1 PG (ref 26.6–33)
MCHC RBC AUTO-ENTMCNC: 33.9 G/DL (ref 31.5–35.7)
MCV RBC AUTO: 92 FL (ref 79–97)
PLATELET # BLD AUTO: 250 X10E3/UL (ref 150–450)
RBC # BLD AUTO: 3.66 X10E6/UL (ref 3.77–5.28)
VZV IGG SER IA-ACNC: 153 INDEX
WBC # BLD AUTO: 6.4 X10E3/UL (ref 3.4–10.8)

## 2023-04-28 LAB — GP B STREP DNA SPEC QL NAA+PROBE: NEGATIVE

## 2023-05-01 PROBLEM — O09.899 MATERNAL VARICELLA, NON-IMMUNE: Status: ACTIVE | Noted: 2023-05-01

## 2023-05-01 PROBLEM — Z28.39 MATERNAL VARICELLA, NON-IMMUNE: Status: ACTIVE | Noted: 2023-05-01

## 2023-05-04 ENCOUNTER — ROUTINE PRENATAL (OUTPATIENT)
Dept: OBSTETRICS AND GYNECOLOGY | Age: 23
End: 2023-05-04
Payer: COMMERCIAL

## 2023-05-04 VITALS — SYSTOLIC BLOOD PRESSURE: 108 MMHG | DIASTOLIC BLOOD PRESSURE: 64 MMHG | WEIGHT: 161 LBS | BODY MASS INDEX: 26.79 KG/M2

## 2023-05-04 DIAGNOSIS — O99.019 MATERNAL ANEMIA IN PREGNANCY, ANTEPARTUM: ICD-10-CM

## 2023-05-04 DIAGNOSIS — Z3A.37 37 WEEKS GESTATION OF PREGNANCY: Primary | ICD-10-CM

## 2023-05-04 DIAGNOSIS — Z14.8 CARRIER OF SPINAL MUSCULAR ATROPHY: ICD-10-CM

## 2023-05-04 DIAGNOSIS — O09.899 MATERNAL VARICELLA, NON-IMMUNE: ICD-10-CM

## 2023-05-04 DIAGNOSIS — Z82.79 FAMILY HISTORY OF OSTEOGENESIS IMPERFECTA: ICD-10-CM

## 2023-05-04 DIAGNOSIS — Q78.0 OSTEOGENESIS IMPERFECTA TYPE I: ICD-10-CM

## 2023-05-04 DIAGNOSIS — Z28.39 MATERNAL VARICELLA, NON-IMMUNE: ICD-10-CM

## 2023-05-04 DIAGNOSIS — E55.9 VITAMIN D DEFICIENCY: ICD-10-CM

## 2023-05-04 LAB
GLUCOSE UR STRIP-MCNC: NEGATIVE MG/DL
PROT UR STRIP-MCNC: NEGATIVE MG/DL

## 2023-05-04 NOTE — PROGRESS NOTES
Gianluca Leyva, a 22 y.o.  at 37w1d, presents for OB follow-up.  She is doing well today.  Denies loss of fluid, vaginal bleeding, and contractions.  Endorses fetal movements.    Objective  BP Readings from Last 3 Encounters:   23 108/64   23 112/62   23 116/60      Wt Readings from Last 3 Encounters:   23 73 kg (161 lb)   23 72.4 kg (159 lb 9.6 oz)   23 73.7 kg (162 lb 6.4 oz)      Total weight gain this pregnancy: 10.9 kg (24 lb)    General: Awake, alert, no apparent distress  Respiratory: No increased work of breathing  Abdomen: Fundus soft and nontender  Extremity: Nontender, no edema    A/P  Diagnoses and all orders for this visit:    1. 37 weeks gestation of pregnancy (Primary)  Overview:  -PNL: 1hr GTT normal. GBS neg  -Pap: n/a  -Genetics: cfDNA/msAFP neg, pos SMA carrier - see below, anatomy Us normal with MFM  -Imm: RI, mary unk (unsure hx, plan for varivax pp), s/p tdap, s/p covid vaccine, rec'd booster, s/p flu shot  -Contraception: h/o OCPs and patch, liked okay but wants Nexplanon postpartum  -Birthplan: boy, circ, epidural, breast  -Care coordination: accepts blood transfusions, no latex allergy, call schedule reviewed  -Discussed possible IOL , will continue to discuss      Orders:  -     POC Urinalysis Dipstick    2. Vitamin D deficiency  Overview:  -vit D improved to 31  -moderately compliant with vit D 64718 IU weekly but now taking gummies more consistently  -managed by Endo      3. Family history of osteogenesis imperfecta  Overview:  Patient's mother, three other siblings, and maternal grandmother      4. Maternal varicella, non-immune  Overview:  -Denies h/o vaccine or disease  -Plan for varivax postpartum      5. Maternal anemia in pregnancy, antepartum  Overview:  -normal Hb electro  -Recommended po iron but patient declined b/c struggles to swallow pills, now taking more consistently  -s/p IV iron x 3  -Repeat CBC and ferritin normalized      6.  Osteogenesis imperfecta type I  Overview:  -Patient, mother, and her two brothers all with OI, h/o multiple fractures  -S/p MFM consult - recs for vaginal delivery unless other obstetric indication for , s/p normal CL screening  -MFM discussed prenatal dx of OI with CVS vs amnio, declined  -plan for serial growth Us, normal at 34wga, repeat at 38wga      7. Carrier of spinal muscular atrophy  Overview:  -screen pos for carrier status  -FOB negative          Labor precautions reviewed  Return in about 1 week (around 2023) for Next f/u with growth Us.    Kim Judge MD

## 2023-05-11 ENCOUNTER — ROUTINE PRENATAL (OUTPATIENT)
Dept: OBSTETRICS AND GYNECOLOGY | Age: 23
End: 2023-05-11
Payer: COMMERCIAL

## 2023-05-11 ENCOUNTER — ANESTHESIA EVENT (OUTPATIENT)
Dept: LABOR AND DELIVERY | Facility: HOSPITAL | Age: 23
End: 2023-05-11
Payer: COMMERCIAL

## 2023-05-11 ENCOUNTER — HOSPITAL ENCOUNTER (INPATIENT)
Facility: HOSPITAL | Age: 23
LOS: 3 days | Discharge: HOME | End: 2023-05-14
Attending: STUDENT IN AN ORGANIZED HEALTH CARE EDUCATION/TRAINING PROGRAM | Admitting: STUDENT IN AN ORGANIZED HEALTH CARE EDUCATION/TRAINING PROGRAM
Payer: COMMERCIAL

## 2023-05-11 ENCOUNTER — HOSPITAL ENCOUNTER (OUTPATIENT)
Dept: LABOR AND DELIVERY | Facility: HOSPITAL | Age: 23
Discharge: HOME | End: 2023-05-11
Payer: COMMERCIAL

## 2023-05-11 ENCOUNTER — ANESTHESIA (OUTPATIENT)
Dept: LABOR AND DELIVERY | Facility: HOSPITAL | Age: 23
End: 2023-05-11
Payer: COMMERCIAL

## 2023-05-11 VITALS — BODY MASS INDEX: 26.99 KG/M2 | DIASTOLIC BLOOD PRESSURE: 62 MMHG | WEIGHT: 162.2 LBS | SYSTOLIC BLOOD PRESSURE: 110 MMHG

## 2023-05-11 DIAGNOSIS — E55.9 VITAMIN D DEFICIENCY: ICD-10-CM

## 2023-05-11 DIAGNOSIS — Z82.79 FAMILY HISTORY OF OSTEOGENESIS IMPERFECTA: ICD-10-CM

## 2023-05-11 DIAGNOSIS — Z28.39 MATERNAL VARICELLA, NON-IMMUNE: ICD-10-CM

## 2023-05-11 DIAGNOSIS — O36.8130 DECREASED FETAL MOVEMENT AFFECTING MANAGEMENT OF PREGNANCY IN THIRD TRIMESTER, SINGLE OR UNSPECIFIED FETUS: ICD-10-CM

## 2023-05-11 DIAGNOSIS — Q78.0 OSTEOGENESIS IMPERFECTA TYPE I: Primary | ICD-10-CM

## 2023-05-11 DIAGNOSIS — O28.3 ECHOGENIC BOWEL OF FETUS ON PRENATAL ULTRASOUND: ICD-10-CM

## 2023-05-11 DIAGNOSIS — Z13.89 SCREENING FOR BLOOD OR PROTEIN IN URINE: ICD-10-CM

## 2023-05-11 DIAGNOSIS — O99.019 MATERNAL ANEMIA IN PREGNANCY, ANTEPARTUM: ICD-10-CM

## 2023-05-11 DIAGNOSIS — O09.899 MATERNAL VARICELLA, NON-IMMUNE: ICD-10-CM

## 2023-05-11 DIAGNOSIS — Q78.0 OSTEOGENESIS IMPERFECTA TYPE I: ICD-10-CM

## 2023-05-11 DIAGNOSIS — Z14.8 CARRIER OF SPINAL MUSCULAR ATROPHY: ICD-10-CM

## 2023-05-11 DIAGNOSIS — Z3A.38 38 WEEKS GESTATION OF PREGNANCY: Primary | ICD-10-CM

## 2023-05-11 PROBLEM — O36.8190 DECREASED FETAL MOVEMENT AFFECTING MANAGEMENT OF PREGNANCY: Status: ACTIVE | Noted: 2023-05-11

## 2023-05-11 LAB
ABO GROUP BLD: NORMAL
ALBUMIN SERPL-MCNC: 3.8 G/DL (ref 3.5–5.2)
ALBUMIN/GLOB SERPL: 1.4 G/DL
ALP SERPL-CCNC: 124 U/L (ref 39–117)
ALT SERPL W P-5'-P-CCNC: 9 U/L (ref 1–33)
ANION GAP SERPL CALCULATED.3IONS-SCNC: 10.7 MMOL/L (ref 5–15)
AST SERPL-CCNC: 12 U/L (ref 1–32)
BASOPHILS # BLD AUTO: 0.01 10*3/MM3 (ref 0–0.2)
BASOPHILS NFR BLD AUTO: 0.1 % (ref 0–1.5)
BILIRUB SERPL-MCNC: <0.2 MG/DL (ref 0–1.2)
BLD GP AB SCN SERPL QL: NEGATIVE
BUN SERPL-MCNC: 7 MG/DL (ref 6–20)
BUN/CREAT SERPL: 11.3 (ref 7–25)
CALCIUM SPEC-SCNC: 9.5 MG/DL (ref 8.6–10.5)
CHLORIDE SERPL-SCNC: 108 MMOL/L (ref 98–107)
CLARITY, POC: CLEAR
CO2 SERPL-SCNC: 20.3 MMOL/L (ref 22–29)
COLOR UR: YELLOW
CREAT SERPL-MCNC: 0.62 MG/DL (ref 0.57–1)
DEPRECATED RDW RBC AUTO: 44.3 FL (ref 37–54)
EGFRCR SERPLBLD CKD-EPI 2021: 130.9 ML/MIN/1.73
EOSINOPHIL # BLD AUTO: 0.04 10*3/MM3 (ref 0–0.4)
EOSINOPHIL NFR BLD AUTO: 0.5 % (ref 0.3–6.2)
ERYTHROCYTE [DISTWIDTH] IN BLOOD BY AUTOMATED COUNT: 13.7 % (ref 12.3–15.4)
GLOBULIN UR ELPH-MCNC: 2.8 GM/DL
GLUCOSE SERPL-MCNC: 80 MG/DL (ref 65–99)
GLUCOSE UR STRIP-MCNC: NEGATIVE MG/DL
HCT VFR BLD AUTO: 33.7 % (ref 34–46.6)
HGB BLD-MCNC: 11.9 G/DL (ref 12–15.9)
IMM GRANULOCYTES # BLD AUTO: 0.07 10*3/MM3 (ref 0–0.05)
IMM GRANULOCYTES NFR BLD AUTO: 0.9 % (ref 0–0.5)
LYMPHOCYTES # BLD AUTO: 1.64 10*3/MM3 (ref 0.7–3.1)
LYMPHOCYTES NFR BLD AUTO: 21.7 % (ref 19.6–45.3)
MCH RBC QN AUTO: 31.5 PG (ref 26.6–33)
MCHC RBC AUTO-ENTMCNC: 35.3 G/DL (ref 31.5–35.7)
MCV RBC AUTO: 89.2 FL (ref 79–97)
MONOCYTES # BLD AUTO: 0.62 10*3/MM3 (ref 0.1–0.9)
MONOCYTES NFR BLD AUTO: 8.2 % (ref 5–12)
NEUTROPHILS NFR BLD AUTO: 5.17 10*3/MM3 (ref 1.7–7)
NEUTROPHILS NFR BLD AUTO: 68.6 % (ref 42.7–76)
NRBC BLD AUTO-RTO: 0 /100 WBC (ref 0–0.2)
PLATELET # BLD AUTO: 235 10*3/MM3 (ref 140–450)
PMV BLD AUTO: 10.2 FL (ref 6–12)
POTASSIUM SERPL-SCNC: 3.7 MMOL/L (ref 3.5–5.2)
PROT SERPL-MCNC: 6.6 G/DL (ref 6–8.5)
PROT UR STRIP-MCNC: NEGATIVE MG/DL
RBC # BLD AUTO: 3.78 10*6/MM3 (ref 3.77–5.28)
RH BLD: POSITIVE
SODIUM SERPL-SCNC: 139 MMOL/L (ref 136–145)
T&S EXPIRATION DATE: NORMAL
WBC NRBC COR # BLD: 7.55 10*3/MM3 (ref 3.4–10.8)

## 2023-05-11 PROCEDURE — 86644 CMV ANTIBODY: CPT | Performed by: STUDENT IN AN ORGANIZED HEALTH CARE EDUCATION/TRAINING PROGRAM

## 2023-05-11 PROCEDURE — 86901 BLOOD TYPING SEROLOGIC RH(D): CPT | Performed by: STUDENT IN AN ORGANIZED HEALTH CARE EDUCATION/TRAINING PROGRAM

## 2023-05-11 PROCEDURE — 86850 RBC ANTIBODY SCREEN: CPT | Performed by: STUDENT IN AN ORGANIZED HEALTH CARE EDUCATION/TRAINING PROGRAM

## 2023-05-11 PROCEDURE — 86645 CMV ANTIBODY IGM: CPT | Performed by: STUDENT IN AN ORGANIZED HEALTH CARE EDUCATION/TRAINING PROGRAM

## 2023-05-11 PROCEDURE — 86747 PARVOVIRUS ANTIBODY: CPT | Performed by: OBSTETRICS & GYNECOLOGY

## 2023-05-11 PROCEDURE — 85025 COMPLETE CBC W/AUTO DIFF WBC: CPT | Performed by: STUDENT IN AN ORGANIZED HEALTH CARE EDUCATION/TRAINING PROGRAM

## 2023-05-11 PROCEDURE — 86778 TOXOPLASMA ANTIBODY IGM: CPT | Performed by: STUDENT IN AN ORGANIZED HEALTH CARE EDUCATION/TRAINING PROGRAM

## 2023-05-11 PROCEDURE — 86900 BLOOD TYPING SEROLOGIC ABO: CPT | Performed by: STUDENT IN AN ORGANIZED HEALTH CARE EDUCATION/TRAINING PROGRAM

## 2023-05-11 PROCEDURE — 86777 TOXOPLASMA ANTIBODY: CPT | Performed by: STUDENT IN AN ORGANIZED HEALTH CARE EDUCATION/TRAINING PROGRAM

## 2023-05-11 PROCEDURE — 80053 COMPREHEN METABOLIC PANEL: CPT | Performed by: STUDENT IN AN ORGANIZED HEALTH CARE EDUCATION/TRAINING PROGRAM

## 2023-05-11 RX ORDER — DIPHENHYDRAMINE HYDROCHLORIDE 50 MG/ML
25 INJECTION INTRAMUSCULAR; INTRAVENOUS NIGHTLY PRN
Status: DISCONTINUED | OUTPATIENT
Start: 2023-05-11 | End: 2023-05-12 | Stop reason: HOSPADM

## 2023-05-11 RX ORDER — TERBUTALINE SULFATE 1 MG/ML
0.25 INJECTION, SOLUTION SUBCUTANEOUS AS NEEDED
Status: DISCONTINUED | OUTPATIENT
Start: 2023-05-11 | End: 2023-05-12 | Stop reason: HOSPADM

## 2023-05-11 RX ORDER — DIPHENHYDRAMINE HCL 25 MG
25 CAPSULE ORAL NIGHTLY PRN
Status: DISCONTINUED | OUTPATIENT
Start: 2023-05-11 | End: 2023-05-12 | Stop reason: HOSPADM

## 2023-05-11 RX ORDER — ACETAMINOPHEN 325 MG/1
650 TABLET ORAL EVERY 4 HOURS PRN
Status: DISCONTINUED | OUTPATIENT
Start: 2023-05-11 | End: 2023-05-12 | Stop reason: HOSPADM

## 2023-05-11 RX ORDER — FAMOTIDINE 10 MG/ML
20 INJECTION, SOLUTION INTRAVENOUS ONCE AS NEEDED
Status: DISCONTINUED | OUTPATIENT
Start: 2023-05-11 | End: 2023-05-12 | Stop reason: HOSPADM

## 2023-05-11 RX ORDER — ONDANSETRON 4 MG/1
4 TABLET, FILM COATED ORAL EVERY 6 HOURS PRN
Status: DISCONTINUED | OUTPATIENT
Start: 2023-05-11 | End: 2023-05-12 | Stop reason: HOSPADM

## 2023-05-11 RX ORDER — SODIUM CHLORIDE 0.9 % (FLUSH) 0.9 %
10 SYRINGE (ML) INJECTION EVERY 12 HOURS SCHEDULED
Status: DISCONTINUED | OUTPATIENT
Start: 2023-05-11 | End: 2023-05-12 | Stop reason: HOSPADM

## 2023-05-11 RX ORDER — EPHEDRINE SULFATE 50 MG/ML
5 INJECTION, SOLUTION INTRAVENOUS
Status: DISCONTINUED | OUTPATIENT
Start: 2023-05-11 | End: 2023-05-12 | Stop reason: HOSPADM

## 2023-05-11 RX ORDER — DIPHENHYDRAMINE HYDROCHLORIDE 50 MG/ML
12.5 INJECTION INTRAMUSCULAR; INTRAVENOUS EVERY 8 HOURS PRN
Status: DISCONTINUED | OUTPATIENT
Start: 2023-05-11 | End: 2023-05-12 | Stop reason: HOSPADM

## 2023-05-11 RX ORDER — MISOPROSTOL 200 UG/1
800 TABLET ORAL ONCE AS NEEDED
Status: DISCONTINUED | OUTPATIENT
Start: 2023-05-11 | End: 2023-05-12 | Stop reason: HOSPADM

## 2023-05-11 RX ORDER — LIDOCAINE HYDROCHLORIDE 10 MG/ML
5 INJECTION, SOLUTION EPIDURAL; INFILTRATION; INTRACAUDAL; PERINEURAL AS NEEDED
Status: DISCONTINUED | OUTPATIENT
Start: 2023-05-11 | End: 2023-05-12 | Stop reason: HOSPADM

## 2023-05-11 RX ORDER — SODIUM CHLORIDE, SODIUM LACTATE, POTASSIUM CHLORIDE, CALCIUM CHLORIDE 600; 310; 30; 20 MG/100ML; MG/100ML; MG/100ML; MG/100ML
125 INJECTION, SOLUTION INTRAVENOUS CONTINUOUS
Status: DISCONTINUED | OUTPATIENT
Start: 2023-05-11 | End: 2023-05-12

## 2023-05-11 RX ORDER — METHYLERGONOVINE MALEATE 0.2 MG/ML
200 INJECTION INTRAVENOUS ONCE AS NEEDED
Status: DISCONTINUED | OUTPATIENT
Start: 2023-05-11 | End: 2023-05-12 | Stop reason: HOSPADM

## 2023-05-11 RX ORDER — FENTANYL CIT 0.2 MG/100ML-ROPIV 0.2%-NACL 0.9% EPIDURAL INJ 2/0.2 MCG/ML-%
10 SOLUTION INJECTION CONTINUOUS
Status: DISCONTINUED | OUTPATIENT
Start: 2023-05-11 | End: 2023-05-12

## 2023-05-11 RX ORDER — CARBOPROST TROMETHAMINE 250 UG/ML
250 INJECTION, SOLUTION INTRAMUSCULAR
Status: DISCONTINUED | OUTPATIENT
Start: 2023-05-11 | End: 2023-05-12 | Stop reason: HOSPADM

## 2023-05-11 RX ORDER — ONDANSETRON 2 MG/ML
4 INJECTION INTRAMUSCULAR; INTRAVENOUS ONCE AS NEEDED
Status: DISCONTINUED | OUTPATIENT
Start: 2023-05-11 | End: 2023-05-12 | Stop reason: HOSPADM

## 2023-05-11 RX ORDER — OXYTOCIN/0.9 % SODIUM CHLORIDE 30/500 ML
250 PLASTIC BAG, INJECTION (ML) INTRAVENOUS CONTINUOUS
Status: ACTIVE | OUTPATIENT
Start: 2023-05-11 | End: 2023-05-11

## 2023-05-11 RX ORDER — OXYTOCIN/0.9 % SODIUM CHLORIDE 30/500 ML
999 PLASTIC BAG, INJECTION (ML) INTRAVENOUS ONCE
Status: DISCONTINUED | OUTPATIENT
Start: 2023-05-11 | End: 2023-05-12 | Stop reason: HOSPADM

## 2023-05-11 RX ORDER — ONDANSETRON 2 MG/ML
4 INJECTION INTRAMUSCULAR; INTRAVENOUS EVERY 6 HOURS PRN
Status: DISCONTINUED | OUTPATIENT
Start: 2023-05-11 | End: 2023-05-12 | Stop reason: HOSPADM

## 2023-05-11 RX ORDER — SODIUM CHLORIDE 0.9 % (FLUSH) 0.9 %
10 SYRINGE (ML) INJECTION AS NEEDED
Status: DISCONTINUED | OUTPATIENT
Start: 2023-05-11 | End: 2023-05-12 | Stop reason: HOSPADM

## 2023-05-11 RX ORDER — IBUPROFEN 600 MG/1
600 TABLET ORAL EVERY 6 HOURS PRN
Status: DISCONTINUED | OUTPATIENT
Start: 2023-05-11 | End: 2023-05-12 | Stop reason: HOSPADM

## 2023-05-11 RX ADMIN — SODIUM CHLORIDE, POTASSIUM CHLORIDE, SODIUM LACTATE AND CALCIUM CHLORIDE 125 ML/HR: 600; 310; 30; 20 INJECTION, SOLUTION INTRAVENOUS at 17:35

## 2023-05-11 RX ADMIN — SODIUM CHLORIDE, POTASSIUM CHLORIDE, SODIUM LACTATE AND CALCIUM CHLORIDE 125 ML/HR: 600; 310; 30; 20 INJECTION, SOLUTION INTRAVENOUS at 21:29

## 2023-05-11 RX ADMIN — DINOPROSTONE 10 MG: 10 INSERT VAGINAL at 18:34

## 2023-05-11 NOTE — PROGRESS NOTES
Gianluca Leyva, a 22 y.o.  at 38w1d, presents for OB follow-up.  She is doing well today.  Denies loss of fluid, vaginal bleeding, and contractions.  Endorses fetal movements.    Objective  BP Readings from Last 3 Encounters:   23 110/62   23 108/64   23 112/62      Wt Readings from Last 3 Encounters:   23 73.6 kg (162 lb 3.2 oz)   23 73 kg (161 lb)   23 72.4 kg (159 lb 9.6 oz)      Total weight gain this pregnancy: 11.4 kg (25 lb 3.2 oz)    General: Awake, alert, no apparent distress  Respiratory: No increased work of breathing  Abdomen: Fundus soft and nontender  Extremity: Nontender, no edema    A/P  Diagnoses and all orders for this visit:    1. 38 weeks gestation of pregnancy (Primary)  Overview:  -PNL: 1hr GTT normal. GBS neg  -Pap: n/a  -Genetics: cfDNA/msAFP neg, pos SMA carrier - see below, anatomy Us normal with MFM  -Imm: RI, mary unk (unsure hx, plan for varivax pp), s/p tdap, s/p covid vaccine, rec'd booster, s/p flu shot  -Contraception: h/o OCPs and patch, liked okay but wants Nexplanon postpartum  -Birthplan: boy, circ, epidural, breast  -Care coordination: accepts blood transfusions, no latex allergy, call schedule reviewed    Orders:  -     POC Urinalysis Dipstick    2. Screening for blood or protein in urine  -     POC Urinalysis Dipstick    3. Echogenic bowel of fetus on prenatal ultrasound  Overview:  -Echogenic bowel noted on Us at 38wga.  Neg carrier screen for CF and cfDNA.  Normal anatomy Us.  -Discussed with MFM Dr. White.  Patient reports decreased fetal movements so recommendation is for delivery now.  Plan for CMV/toxo Ab screen at admission.  Also notify peds of finding.        4. Maternal varicella, non-immune  Overview:  -Denies h/o vaccine or disease  -Plan for varivax postpartum      5. Family history of osteogenesis imperfecta  Overview:  Patient's mother, three other siblings, and maternal grandmother      6. Vitamin D  deficiency  Overview:  -vit D improved to 31  -moderately compliant with vit D 86198 IU weekly but now taking gummies more consistently  -managed by Endo      7. Maternal anemia in pregnancy, antepartum  Overview:  -normal Hb electro  -Recommended po iron but patient declined b/c struggles to swallow pills, now taking more consistently  -s/p IV iron x 3  -Repeat CBC and ferritin normalized      8. Osteogenesis imperfecta type I  Overview:  -Patient, mother, and her two brothers all with OI, h/o multiple fractures  -S/p MFM consult - recs for vaginal delivery unless other obstetric indication for , s/p normal CL screening  -MFM discussed prenatal dx of OI with CVS vs amnio, declined  -plan for serial growth Us, normal at 34wga, repeat at 38wga      9. Carrier of spinal muscular atrophy  Overview:  -screen pos for carrier status  -FOB negative      To hospital for delivery for echogenic bowel and decreased fetal movement at term.  Patient amenable.  Plan for Cervidil.  Discussed with on-call MD Dr. Stafford and labor and delivery.    Kim Judge MD

## 2023-05-11 NOTE — PLAN OF CARE
Goal Outcome Evaluation:  Plan of Care Reviewed With: patient        Progress: improving  Outcome Evaluation: pt admitted for IOL due to echogenic bowel. cervidil placed at 1834. pt denies feeling contractions and pain at this time. VS WNL. FHT category 1.

## 2023-05-12 LAB
ATMOSPHERIC PRESS: 749.8 MMHG
ATMOSPHERIC PRESS: 750.4 MMHG
BASE EXCESS BLDCOA CALC-SCNC: -2.4 MMOL/L (ref -2–2)
BASE EXCESS BLDCOV CALC-SCNC: -4.2 MMOL/L (ref -30–30)
BDY SITE: ABNORMAL
BDY SITE: ABNORMAL
CMV IGG SERPL IA-ACNC: 5.9 U/ML (ref 0–0.59)
CMV IGM SERPL IA-ACNC: <30 AU/ML (ref 0–29.9)
COLLECT TME SMN: ABNORMAL
HCO3 BLDCOA-SCNC: 25 MMOL/L (ref 22–28)
HCO3 BLDCOV-SCNC: 20.9 MMOL/L
LABORATORY COMMENT REPORT: NORMAL
MODALITY: ABNORMAL
MODALITY: ABNORMAL
NOTE: ABNORMAL
NOTE: ABNORMAL
PCO2 BLDCOA: 51.9 MMHG (ref 43–63)
PCO2 BLDCOV: 37.6 MM HG (ref 35–51.3)
PH BLDCOA: 7.29 PH UNITS (ref 7.18–7.34)
PH BLDCOV: 7.35 PH UNITS (ref 7.26–7.4)
PO2 BLDCOA: <21.2 MMHG (ref 12–26)
PO2 BLDCOV: 27.4 MM HG (ref 19–39)
SAO2 % BLDCOA: 14.6 % (ref 92–99)
SAO2 % BLDCOA: 48.8 % (ref 92–99)
SAO2 % BLDCOV: ABNORMAL %
T GONDII IGG SERPL IA-ACNC: <3 IU/ML (ref 0–7.1)
T GONDII IGM SER IA-ACNC: <3 AU/ML (ref 0–7.9)

## 2023-05-12 PROCEDURE — 82803 BLOOD GASES ANY COMBINATION: CPT

## 2023-05-12 PROCEDURE — 25010000002 MORPHINE PER 10 MG: Performed by: ANESTHESIOLOGY

## 2023-05-12 PROCEDURE — 25010000002 KETOROLAC TROMETHAMINE PER 15 MG: Performed by: ANESTHESIOLOGY

## 2023-05-12 PROCEDURE — 25010000002 DIPHENHYDRAMINE PER 50 MG: Performed by: STUDENT IN AN ORGANIZED HEALTH CARE EDUCATION/TRAINING PROGRAM

## 2023-05-12 PROCEDURE — 25010000002 AZITHROMYCIN PER 500 MG: Performed by: STUDENT IN AN ORGANIZED HEALTH CARE EDUCATION/TRAINING PROGRAM

## 2023-05-12 PROCEDURE — 25010000002 CEFAZOLIN IN DEXTROSE 2-4 GM/100ML-% SOLUTION: Performed by: STUDENT IN AN ORGANIZED HEALTH CARE EDUCATION/TRAINING PROGRAM

## 2023-05-12 PROCEDURE — 88307 TISSUE EXAM BY PATHOLOGIST: CPT

## 2023-05-12 PROCEDURE — 59510 CESAREAN DELIVERY: CPT | Performed by: STUDENT IN AN ORGANIZED HEALTH CARE EDUCATION/TRAINING PROGRAM

## 2023-05-12 PROCEDURE — C1755 CATHETER, INTRASPINAL: HCPCS | Performed by: ANESTHESIOLOGY

## 2023-05-12 PROCEDURE — 25010000002 ONDANSETRON PER 1 MG: Performed by: STUDENT IN AN ORGANIZED HEALTH CARE EDUCATION/TRAINING PROGRAM

## 2023-05-12 RX ORDER — HYDROMORPHONE HYDROCHLORIDE 1 MG/ML
0.5 INJECTION, SOLUTION INTRAMUSCULAR; INTRAVENOUS; SUBCUTANEOUS
Status: ACTIVE | OUTPATIENT
Start: 2023-05-12 | End: 2023-05-13

## 2023-05-12 RX ORDER — DROPERIDOL 2.5 MG/ML
0.62 INJECTION, SOLUTION INTRAMUSCULAR; INTRAVENOUS
Status: DISCONTINUED | OUTPATIENT
Start: 2023-05-12 | End: 2023-05-14 | Stop reason: HOSPADM

## 2023-05-12 RX ORDER — ONDANSETRON 2 MG/ML
4 INJECTION INTRAMUSCULAR; INTRAVENOUS ONCE AS NEEDED
Status: DISCONTINUED | OUTPATIENT
Start: 2023-05-12 | End: 2023-05-14 | Stop reason: HOSPADM

## 2023-05-12 RX ORDER — ACETAMINOPHEN 500 MG
1000 TABLET ORAL ONCE
Status: DISCONTINUED | OUTPATIENT
Start: 2023-05-12 | End: 2023-05-12 | Stop reason: HOSPADM

## 2023-05-12 RX ORDER — KETOROLAC TROMETHAMINE 30 MG/ML
INJECTION, SOLUTION INTRAMUSCULAR; INTRAVENOUS AS NEEDED
Status: DISCONTINUED | OUTPATIENT
Start: 2023-05-12 | End: 2023-05-12 | Stop reason: SURG

## 2023-05-12 RX ORDER — MORPHINE SULFATE 2 MG/ML
2 INJECTION, SOLUTION INTRAMUSCULAR; INTRAVENOUS
Status: ACTIVE | OUTPATIENT
Start: 2023-05-12 | End: 2023-05-13

## 2023-05-12 RX ORDER — OXYCODONE HCL 5 MG/5 ML
5 SOLUTION, ORAL ORAL EVERY 4 HOURS PRN
Status: DISCONTINUED | OUTPATIENT
Start: 2023-05-12 | End: 2023-05-14 | Stop reason: HOSPADM

## 2023-05-12 RX ORDER — SENNOSIDES 8.8 MG/5ML
5 LIQUID ORAL 2 TIMES DAILY
Status: DISCONTINUED | OUTPATIENT
Start: 2023-05-12 | End: 2023-05-14 | Stop reason: HOSPADM

## 2023-05-12 RX ORDER — ONDANSETRON 4 MG/1
4 TABLET, ORALLY DISINTEGRATING ORAL EVERY 4 HOURS PRN
Status: DISCONTINUED | OUTPATIENT
Start: 2023-05-12 | End: 2023-05-14 | Stop reason: HOSPADM

## 2023-05-12 RX ORDER — DIPHENHYDRAMINE HYDROCHLORIDE 50 MG/ML
25 INJECTION INTRAMUSCULAR; INTRAVENOUS EVERY 4 HOURS PRN
Status: DISCONTINUED | OUTPATIENT
Start: 2023-05-12 | End: 2023-05-14 | Stop reason: HOSPADM

## 2023-05-12 RX ORDER — OXYTOCIN/0.9 % SODIUM CHLORIDE 30/500 ML
125 PLASTIC BAG, INJECTION (ML) INTRAVENOUS CONTINUOUS PRN
Status: COMPLETED | OUTPATIENT
Start: 2023-05-12 | End: 2023-05-12

## 2023-05-12 RX ORDER — SODIUM CHLORIDE, SODIUM LACTATE, POTASSIUM CHLORIDE, CALCIUM CHLORIDE 600; 310; 30; 20 MG/100ML; MG/100ML; MG/100ML; MG/100ML
75 INJECTION, SOLUTION INTRAVENOUS CONTINUOUS
Status: DISCONTINUED | OUTPATIENT
Start: 2023-05-13 | End: 2023-05-14 | Stop reason: HOSPADM

## 2023-05-12 RX ORDER — HYDROXYZINE HCL 10 MG/5 ML
50 SOLUTION, ORAL ORAL EVERY 6 HOURS PRN
Status: DISCONTINUED | OUTPATIENT
Start: 2023-05-12 | End: 2023-05-14 | Stop reason: HOSPADM

## 2023-05-12 RX ORDER — KETOROLAC TROMETHAMINE 15 MG/ML
15 INJECTION, SOLUTION INTRAMUSCULAR; INTRAVENOUS EVERY 6 HOURS
Status: DISPENSED | OUTPATIENT
Start: 2023-05-13 | End: 2023-05-14

## 2023-05-12 RX ORDER — CEFAZOLIN SODIUM 2 G/100ML
2 INJECTION, SOLUTION INTRAVENOUS ONCE
Status: COMPLETED | OUTPATIENT
Start: 2023-05-12 | End: 2023-05-12

## 2023-05-12 RX ORDER — SODIUM CHLORIDE 9 MG/ML
50 INJECTION, SOLUTION INTRAVENOUS CONTINUOUS
Status: DISCONTINUED | OUTPATIENT
Start: 2023-05-12 | End: 2023-05-12

## 2023-05-12 RX ORDER — DOCUSATE SODIUM 50 MG/5 ML
50 LIQUID (ML) ORAL 2 TIMES DAILY
Status: DISCONTINUED | OUTPATIENT
Start: 2023-05-12 | End: 2023-05-14 | Stop reason: HOSPADM

## 2023-05-12 RX ORDER — NALOXONE HCL 0.4 MG/ML
0.2 VIAL (ML) INJECTION
Status: DISCONTINUED | OUTPATIENT
Start: 2023-05-12 | End: 2023-05-14 | Stop reason: HOSPADM

## 2023-05-12 RX ORDER — ONDANSETRON 2 MG/ML
4 INJECTION INTRAMUSCULAR; INTRAVENOUS ONCE AS NEEDED
Status: COMPLETED | OUTPATIENT
Start: 2023-05-12 | End: 2023-05-12

## 2023-05-12 RX ORDER — ACETAMINOPHEN 325 MG/10.15ML
650 SUSPENSION ORAL EVERY 6 HOURS
Status: DISCONTINUED | OUTPATIENT
Start: 2023-05-13 | End: 2023-05-14 | Stop reason: HOSPADM

## 2023-05-12 RX ORDER — MORPHINE SULFATE 1 MG/ML
INJECTION, SOLUTION EPIDURAL; INTRATHECAL; INTRAVENOUS AS NEEDED
Status: DISCONTINUED | OUTPATIENT
Start: 2023-05-12 | End: 2023-05-12 | Stop reason: SURG

## 2023-05-12 RX ORDER — DIPHENHYDRAMINE HCL 25 MG
25 CAPSULE ORAL EVERY 4 HOURS PRN
Status: DISCONTINUED | OUTPATIENT
Start: 2023-05-12 | End: 2023-05-14 | Stop reason: HOSPADM

## 2023-05-12 RX ORDER — OXYCODONE HCL 5 MG/5 ML
10 SOLUTION, ORAL ORAL EVERY 4 HOURS PRN
Status: DISCONTINUED | OUTPATIENT
Start: 2023-05-12 | End: 2023-05-14 | Stop reason: HOSPADM

## 2023-05-12 RX ORDER — AMOXICILLIN 250 MG
1 CAPSULE ORAL 2 TIMES DAILY
Status: DISCONTINUED | OUTPATIENT
Start: 2023-05-12 | End: 2023-05-12 | Stop reason: SDUPTHER

## 2023-05-12 RX ORDER — KETOROLAC TROMETHAMINE 30 MG/ML
30 INJECTION, SOLUTION INTRAMUSCULAR; INTRAVENOUS ONCE
Status: DISCONTINUED | OUTPATIENT
Start: 2023-05-12 | End: 2023-05-14 | Stop reason: HOSPADM

## 2023-05-12 RX ORDER — ACETAMINOPHEN 160 MG/5ML
1000 SOLUTION ORAL ONCE
Status: COMPLETED | OUTPATIENT
Start: 2023-05-12 | End: 2023-05-12

## 2023-05-12 RX ORDER — PHYTONADIONE 1 MG/.5ML
INJECTION, EMULSION INTRAMUSCULAR; INTRAVENOUS; SUBCUTANEOUS
Status: ACTIVE
Start: 2023-05-12 | End: 2023-05-13

## 2023-05-12 RX ORDER — LIDOCAINE HYDROCHLORIDE AND EPINEPHRINE BITARTRATE 20; .01 MG/ML; MG/ML
INJECTION, SOLUTION SUBCUTANEOUS AS NEEDED
Status: DISCONTINUED | OUTPATIENT
Start: 2023-05-12 | End: 2023-05-12 | Stop reason: SURG

## 2023-05-12 RX ORDER — ERYTHROMYCIN 5 MG/G
OINTMENT OPHTHALMIC
Status: ACTIVE
Start: 2023-05-12 | End: 2023-05-13

## 2023-05-12 RX ORDER — OXYTOCIN/0.9 % SODIUM CHLORIDE 30/500 ML
2-20 PLASTIC BAG, INJECTION (ML) INTRAVENOUS
Status: DISCONTINUED | OUTPATIENT
Start: 2023-05-12 | End: 2023-05-12

## 2023-05-12 RX ORDER — FAMOTIDINE 10 MG/ML
20 INJECTION, SOLUTION INTRAVENOUS ONCE AS NEEDED
Status: COMPLETED | OUTPATIENT
Start: 2023-05-12 | End: 2023-05-12

## 2023-05-12 RX ORDER — ACETAMINOPHEN 160 MG/5ML
1000 SUSPENSION, ORAL (FINAL DOSE FORM) ORAL EVERY 6 HOURS
Status: COMPLETED | OUTPATIENT
Start: 2023-05-12 | End: 2023-05-13

## 2023-05-12 RX ADMIN — AZITHROMYCIN MONOHYDRATE 500 MG: 500 INJECTION, POWDER, LYOPHILIZED, FOR SOLUTION INTRAVENOUS at 15:41

## 2023-05-12 RX ADMIN — Medication 2 MILLI-UNITS/MIN: at 07:18

## 2023-05-12 RX ADMIN — Medication 125 ML/HR: at 17:27

## 2023-05-12 RX ADMIN — Medication 8 ML/HR: at 12:38

## 2023-05-12 RX ADMIN — SODIUM CHLORIDE, POTASSIUM CHLORIDE, SODIUM LACTATE AND CALCIUM CHLORIDE 125 ML/HR: 600; 310; 30; 20 INJECTION, SOLUTION INTRAVENOUS at 06:38

## 2023-05-12 RX ADMIN — LIDOCAINE HYDROCHLORIDE,EPINEPHRINE BITARTRATE 7 ML: 20; .01 INJECTION, SOLUTION INFILTRATION; PERINEURAL at 15:49

## 2023-05-12 RX ADMIN — MORPHINE SULFATE 3 MG: 1 INJECTION, SOLUTION EPIDURAL; INTRATHECAL; INTRAVENOUS at 16:20

## 2023-05-12 RX ADMIN — LIDOCAINE HYDROCHLORIDE,EPINEPHRINE BITARTRATE 5 ML: 20; .01 INJECTION, SOLUTION INFILTRATION; PERINEURAL at 16:00

## 2023-05-12 RX ADMIN — CEFAZOLIN SODIUM 2 G: 2 INJECTION, SOLUTION INTRAVENOUS at 15:33

## 2023-05-12 RX ADMIN — ACETAMINOPHEN 1000 MG: 325 SUSPENSION ORAL at 22:14

## 2023-05-12 RX ADMIN — FAMOTIDINE 20 MG: 10 INJECTION INTRAVENOUS at 15:26

## 2023-05-12 RX ADMIN — DIPHENHYDRAMINE HYDROCHLORIDE 25 MG: 50 INJECTION, SOLUTION INTRAMUSCULAR; INTRAVENOUS at 02:45

## 2023-05-12 RX ADMIN — KETOROLAC TROMETHAMINE 30 MG: 30 INJECTION, SOLUTION INTRAMUSCULAR at 16:39

## 2023-05-12 RX ADMIN — EPHEDRINE SULFATE 5 MG: 50 INJECTION INTRAVENOUS at 13:31

## 2023-05-12 RX ADMIN — ONDANSETRON 4 MG: 2 INJECTION INTRAMUSCULAR; INTRAVENOUS at 15:27

## 2023-05-12 RX ADMIN — SODIUM CHLORIDE, POTASSIUM CHLORIDE, SODIUM LACTATE AND CALCIUM CHLORIDE 75 ML/HR: 600; 310; 30; 20 INJECTION, SOLUTION INTRAVENOUS at 23:48

## 2023-05-12 RX ADMIN — SODIUM CHLORIDE, POTASSIUM CHLORIDE, SODIUM LACTATE AND CALCIUM CHLORIDE 1000 ML: 600; 310; 30; 20 INJECTION, SOLUTION INTRAVENOUS at 12:18

## 2023-05-12 RX ADMIN — ACETAMINOPHEN 1000 MG: 325 SUSPENSION ORAL at 15:31

## 2023-05-12 RX ADMIN — LIDOCAINE HYDROCHLORIDE,EPINEPHRINE BITARTRATE 8 ML: 20; .01 INJECTION, SOLUTION INFILTRATION; PERINEURAL at 15:34

## 2023-05-12 RX ADMIN — SODIUM CHLORIDE 300 ML: 9 INJECTION, SOLUTION INTRAVENOUS at 13:16

## 2023-05-12 NOTE — L&D DELIVERY NOTE
[facility]   Obstetrics and Gynecology     2023    Patient:Gianluca Leyva   MR#:[id number]     Section Procedure Note    Indications: non-reassuring fetal status    Pre-operative Diagnosis: Intrauterine pregnancy at 38w2d    Post-operative Diagnosis: same    Procedure:  Low transverse  section     Surgeon: Kim Judge MD     Assistants: Martha Irvin MD    Anesthesia: combined spinal-epidural    Prenatal care problem list:  Patient Active Problem List   Diagnosis   • Osteogenesis imperfecta type I   • Family history of osteogenesis imperfecta   • 38 weeks gestation of pregnancy   • Vitamin D deficiency   • Carrier of spinal muscular atrophy   • Maternal anemia in pregnancy, antepartum   • Maternal varicella, non-immune   • Echogenic bowel of fetus on prenatal ultrasound   • Decreased fetal movement affecting management of pregnancy   •  delivery delivered       Procedure Details   The patient was seen in the LDR preoperatively. The risks, benefits, complications, treatment options, and expected outcomes were discussed with the patient.  The patient concurred with the proposed plan, giving informed consent.  The site of surgery is discussed. The patient was taken to Operating Room # 1, identified as Gianluca Leyva and the procedure verified as  Delivery. A Time Out was held and the above information confirmed.    After induction of anesthesia, the patient was draped and prepped in the usual sterile manner. A Pfannenstiel incision was made and carried down through the subcutaneous tissue to the fascia. Fascial incision was made and extended transversely. The fascia was  from the underlying rectus tissue superiorly and inferiorly. The peritoneum was identified and entered. Peritoneal incision was extended longitudinally.  A low transverse uterine incision was made.  Delivered from vertex presentation was a male  fetus 3110 g (6 lb 13.7 oz)  with  Apgar scores of 8 at one minute and 9 at five minutes. Umbilical cord was clamped and cut after a 30-second delay.  Cord gases and blood were obtained for evaluation. The placenta was removed intact and appeared normal. The uterine outline, tubes and ovaries appeared normal.  The uterine incision was closed with running locked sutures of 0 monocryl. A second imbricating layer of the same suture was placed.  There was a small tear at the suture insertion site on the upper edge of the myometrium.  A box stitch was placed around the bleeding tissue.  Excellent hemostasis was observed.  The posterior cul-de-sac was cleared of all blood.  The uterus was returned to the abdomen.  Surgicel was placed over suture line.  The paracolic gutters were cleared of all blood.  The uterus was reexamined and excellent hemostasis was confirmed.    The fascia was then reapproximated with running sutures of 0 Vicryl. The deep subcutaneous layer was reapproximated with 3-0 monocryl in a running fashion. The skin was reapproximated with 4-0 monocryl in a subcuticular fashion.     Instrument, sponge, and needle counts were correct prior to abdominal closure and at the conclusion of the case.     Surgical assistant was responsible for performing the following activities: Retraction, Suction, Irrigation, Closing, Placing Dressing and Delivery of Fetus and their skilled assistance was necessary for the success of this case.    Findings:  YOB: 2021   Time of birth: 4:01 PM    Baby weight: 109.7  oz  Live, viable male infant        APGARS  One minute Five minutes   Skin color: 0   1     Heart rate: 2   2     Grimace: 2   2     Muscle tone: 2   2     Breathin   2     Totals: 8   9       Normal gravid uterus  Normal bilateral fallopian tubes and ovaries    Estimated Blood Loss:  300 mL    Calculated Blood Loss:  Quantitative Blood Loss (mL): 849 mL           Specimens:  Placenta            Complications:  None; patient  tolerated the procedure well.           Disposition: PACU - hemodynamically stable.           Condition: stable    Cord gases:    pH, Cord Venous   Date Value Ref Range Status   05/12/2023 7.353 7.260 - 7.400 pH Units Final       Kim Judge MD  5/12/2023   17:16 EDT

## 2023-05-12 NOTE — ANESTHESIA PROCEDURE NOTES
Labor Epidural    Pre-sedation assessment completed: 5/12/2023 12:29 PM    Patient reassessed immediately prior to procedure    Patient location during procedure: OB  Start Time: 5/12/2023 12:29 PM  Stop Time: 5/12/2023 12:33 PM  Performed By  Anesthesiologist: Jordy Burns MD  Preanesthetic Checklist  Completed: patient identified, IV checked, site marked, risks and benefits discussed, surgical consent, monitors and equipment checked, pre-op evaluation and timeout performed  Additional Notes  Labor epidural using Arrow kit, no heme/CSF aspirated, no paraesthesias.  Test dose with 3cc 1.5% lido with epi is negative.    Prep:  Pt Position:sitting  Sterile Tech:cap, gloves, mask and sterile barrier  Prep:chlorhexidine gluconate and isopropyl alcohol  Monitoring:blood pressure monitoring, continuous pulse oximetry and EKG  Epidural Block Procedure:  Approach:midline  Guidance:landmark technique and palpation technique  Location:L3-L4  Needle Type:Tuohy  Needle Gauge:17  Loss of Resistance Medium: air  Loss of Resistance: 8cm  Cath Depth at skin:12 cm  Paresthesia: none  Aspiration:negative  Test Dose:negative  Number of Attempts: 2  Post Assessment:  Dressing:occlusive dressing applied and secured with tape  Pt Tolerance:patient tolerated the procedure well with no apparent complications  Complications:no

## 2023-05-12 NOTE — ANESTHESIA POSTPROCEDURE EVALUATION
"Patient: Gianluca Leyva    Procedure Summary     Date: 23 Room / Location:  [facility] /  [facility]    Anesthesia Start: 121 Anesthesia Stop:     Procedure:  SECTION PRIMARY (Abdomen) Diagnosis:     Surgeons: Kim Judge MD Provider: Jordy Burns MD    Anesthesia Type: epidural ASA Status: 3          Anesthesia Type: epidural    Vitals  Vitals Value Taken Time   /52 23 1831   Temp 37.1 °C (98.7 °F) 23 1702   Pulse 83 23 1840   Resp 18 23 1831   SpO2 98 % 23 1840   Vitals shown include unvalidated device data.        Post Anesthesia Care and Evaluation    Patient location during evaluation: bedside  Patient participation: complete - patient participated  Level of consciousness: awake  Pain management: adequate    Airway patency: patent  Anesthetic complications: No anesthetic complications    Cardiovascular status: acceptable  Respiratory status: acceptable  Hydration status: acceptable    Comments: /52 (BP Location: Right arm, Patient Position: Sitting)   Pulse 86   Temp 37.1 °C (98.7 °F) (Oral)   Resp 18   Ht 165.1 cm (65\")   Wt 73.5 kg (162 lb)   LMP 2022   SpO2 98%   Breastfeeding Unknown   BMI 26.96 kg/m²       "

## 2023-05-12 NOTE — ANESTHESIA PREPROCEDURE EVALUATION
Anesthesia Evaluation     Patient summary reviewed and Nursing notes reviewed   no history of anesthetic complications:  NPO Solid Status: > 8 hours  NPO Liquid Status: > 4 hours           Airway   Mallampati: II  TM distance: >3 FB  Neck ROM: full  No difficulty expected  Dental - normal exam     Pulmonary - negative pulmonary ROS and normal exam   (-) COPD, asthma, not a smoker, lung cancer  Cardiovascular - negative cardio ROS and normal exam  Exercise tolerance: excellent (>7 METS)    Rhythm: regular  Rate: normal    (-) hypertension, valvular problems/murmurs, past MI, CAD, dysrhythmias, angina, CHF, cardiac stents, pericardial effusion      Neuro/Psych- negative ROS  (-) seizures, TIA, CVA  GI/Hepatic/Renal/Endo - negative ROS   (-) hiatal hernia, GERD, PUD, hepatitis, liver disease, no renal disease, diabetes, GI bleed, no thyroid disorder    Musculoskeletal (-) negative ROS        ROS comment: Pt w/ known dx/o OI type 1.  Known maternal family history, her 3 siblings are affected as well.  Pt had been attached to Dr Mittal clinic as a child, now w/ adult primary.  No recent hx/o fx, tendon or ligamental damage. She has no notable bony deformities.    Abdominal  - normal exam   Substance History - negative use     OB/GYN    (+) Pregnant,     Comment: 38wk IUP complicated by OI.      Other - negative ROS         Other Comment: Osteogenesis imperfecta type I                Anesthesia Plan    ASA 3     epidural     (  38w1d    A lengthy discussion is had w/ this pt and her mother, who is also an OI patient.  Per the pt her genetic testing has shown her to have OI type 1, the most common and most mild form of the disease.  I have discuss with them the standard process of pt's receiving labor epidurals as well and the risks and benefits.  With her limited history of orthopedic issues, none in the recent past, and mild form of the disease I think that it is reasonable to progress w/ an epidural should the pt  elect to have one.  The pt voices her understanding, as does her mother.  )    Anesthetic plan, risks, benefits, and alternatives have been provided, discussed and informed consent has been obtained with: patient and mother.        CODE STATUS:    Level Of Support Discussed With: Patient  Code Status (Patient has no pulse and is not breathing): CPR (Attempt to Resuscitate)  Medical Interventions (Patient has pulse or is breathing): Full Support

## 2023-05-12 NOTE — PROGRESS NOTES
"Labor Progress Note    Subj: doing well, comfortable with epidural    Obj:  /60   Pulse 76   Temp 98.1 °F (36.7 °C) (Oral)   Resp 18   Ht 165.1 cm (65\")   Wt 73.5 kg (162 lb)   LMP 2022   SpO2 96%   BMI 26.96 kg/m²     Gen: awake, alert  Resp: no inc work of breathing  Abd: soft between contractions  SVE: 2-3/80/-2, moderately bloody fluid    EFM: normal baseline, moderate variability with periods of minimal, no accels, persistent late and variable decels  [place]: ctx q2-4 min      Intake/Output Summary (Last 24 hours) at 2023 1407  Last data filed at 2023 0700  Gross per 24 hour   Intake 1962 ml   Output 450 ml   Net 1512 ml       A/P:  22 y.o. female  with osteogenesis imperfecta and persistent category 2 tracing    Patient has had intermittent now persistent late and variable decelerations for approximately 1.5 hours despite all resuscitative efforts including maternal repositioning, IV fluids, amnioinfusion, and cessation of Pitocin.  Baby has largely maintained moderate variability but has had periods of minimal.  Her cervix is 2 to 3 cm dilated.  Moderately bloody fluid was noted during cervical exam.  Also, baby did not respond to scalp stimulation.  Due to persistent category 2 tracing despite interventions and inability to augment labor, I recommended patient proceed with primary  section.  She is likely amenable but would like to wait for her father to arrive in 15 to 20 minutes to make a final decision.  We discussed that I am concerned about delaying this delivery any further and encouraged her to decide quickly.  In the meantime, she is okay with our unit preparing for a  to expedite her delivery once a decision is made.  Prophylactic antibiotics ordered.  Labor and delivery staff notified.    Kim Judge MD  23  14:47 EDT    "

## 2023-05-12 NOTE — H&P
History and physical    Admission date 2023     Patient: Gianluca Leyva MRN: [id number]   YOB: 2000 Age: 22 y.o. Sex: female     Chief Complaint   Patient presents with   • Scheduled Induction     Scheduled induction d/t echogenic bowel of fetus. Pt denies any leakage of fluid or blood.        HPI:    Gianluca Leyva is a 22 y.o.,  AT 38w1d admitted for induction of labor..  Patient is known to have osteogenesis imperfecta.  She was found to have echogenic bowel of the fetus on ultrasound.  After consultation with MFM and associated decreased fetal movement, patient was sent for induction of labor.  Denies vaginal bleeding, leakage of fluid, or contractions.        Decreased fetal movement affecting management of pregnancy    OB History    Para Term  AB Living   1 0 0 0 0 0   SAB IAB Ectopic Molar Multiple Live Births   0 0 0 0 0 0      # Outcome Date GA Lbr Bigg/2nd Weight Sex Delivery Anes PTL Lv   1 Current              Past Medical History:   Diagnosis Date   • Anemia in pregnancy    • Osteogenesis imperfecta      Past Surgical History:   Procedure Laterality Date   • ELBOW ARTHROPLASTY     • KNEE ARTHROPLASTY     • MUSCLE REPAIR      shoulder   • SHOULDER ARTHROSCOPY       No current facility-administered medications on file prior to encounter.     Current Outpatient Medications on File Prior to Encounter   Medication Sig Dispense Refill   • Cholecalciferol (PA VITAMIN D-3 GUMMY PO) Take  by mouth.     • Prenatal MV & Min w/FA-DHA (PRENATAL ADULT GUMMY/DHA/FA PO) Take 1 tablet by mouth Daily.     • valACYclovir (VALTREX) 1000 MG tablet TAKE 2 TABLETS BY MOUTH 2 TIMES EVERY DAY FOR 1 DAY         ROS:      Except as outlined in history of physical illness, patient denies any changes in her GYN, , GI systems. All other systems reviewed are negative.      OBJECTIVE:     Vitals:   Vitals:    23 1652 23 1729 23 1738   BP: 119/63 118/64    Pulse: 85 87    Resp:  " 16    Temp:  98.9 °F (37.2 °C)    TempSrc:  Oral    Weight:   73.5 kg (162 lb)   Height:   165.1 cm (65\")         Appearance/Psychiatric: In no distress   Constitutional: The patient is well nourished   Cardiovascular: She does not have edema. Heart RRR  Respiratory: Respiratory effort is normal. CTAB   Abdomen: Soft, gravid.  Ext: nontender, no edema. +2/4 bilateral patellar reflexes   Cx; deferred       LOS: 0 days    Casimiro Stafford MD   May 11, 2023    Assessment and Plan:   Decreased fetal movement affecting management of pregnancy [O36.8190]  38-week 1 day intrauterine pregnancy  Echogenic foci and small bowel of fetus, increased  History of osteogenesis imperfecta    Will admit, cervical ripening with Cervidil, check CMV and parvo antibodies.  Reviewed situation with patient and family and they agree.      "

## 2023-05-12 NOTE — PLAN OF CARE
Problem: Adult Inpatient Plan of Care  Goal: Plan of Care Review  Outcome: Ongoing, Progressing  Flowsheets (Taken 5/12/2023 1800)  Progress: improving  Plan of Care Reviewed With:   patient   significant other   mother  Outcome Evaluation: C/S Delivery at 1601. Patient is currently in RICK with infant in recovery with FOB at bedside attentive to patient and infant needs. Fundus is firm, midline a the U with scant to light bleeding. Patient reports no pain currently. RN will continue to monitor patient and infant in recovery period.  Goal: Patient-Specific Goal (Individualized)  Outcome: Ongoing, Progressing  Flowsheets (Taken 5/12/2023 1800)  Patient-Specific Goals (Include Timeframe): Healthy mom and baby by discharge  Individualized Care Needs: Patient has OI, RICK, Epidural, Breastfeeding  Anxieties, Fears or Concerns: Fetal wellbeing, Labor process  Goal: Absence of Hospital-Acquired Illness or Injury  Outcome: Ongoing, Progressing  Intervention: Identify and Manage Fall Risk  Recent Flowsheet Documentation  Taken 5/12/2023 0715 by nAgie Ulloa RN  Safety Promotion/Fall Prevention: safety round/check completed  Intervention: Prevent Skin Injury  Recent Flowsheet Documentation  Taken 5/12/2023 1702 by Angie Ulloa RN  Body Position: sitting up in bed  Taken 5/12/2023 0715 by Angie Ulloa RN  Body Position: sitting up in bed  Intervention: Prevent and Manage VTE (Venous Thromboembolism) Risk  Recent Flowsheet Documentation  Taken 5/12/2023 1748 by Angie Ulloa RN  VTE Prevention/Management:   bilateral   sequential compression devices on  Taken 5/12/2023 1731 by Angie Ulloa RN  VTE Prevention/Management:   bilateral   sequential compression devices on  Taken 5/12/2023 1702 by Angie Ulloa RN  VTE Prevention/Management:   bilateral   sequential compression devices on  Goal: Optimal Comfort and Wellbeing  Outcome: Ongoing, Progressing  Intervention: Provide Person-Centered Care  Recent Flowsheet  Documentation  Taken 5/12/2023 1702 by Angie Ulloa RN  Trust Relationship/Rapport:   care explained   choices provided   questions answered   questions encouraged   thoughts/feelings acknowledged   reassurance provided  Taken 5/12/2023 0715 by Angie Ulloa RN  Trust Relationship/Rapport:   choices provided   care explained   questions answered   questions encouraged   thoughts/feelings acknowledged  Goal: Readiness for Transition of Care  Outcome: Ongoing, Progressing   Goal Outcome Evaluation:  Plan of Care Reviewed With: patient, significant other, mother        Progress: improving  Outcome Evaluation: C/S Delivery at 1601. Patient is currently in RICK with infant in recovery with FOB at bedside attentive to patient and infant needs. Fundus is firm, midline a the U with scant to light bleeding. Patient reports no pain currently. RN will continue to monitor patient and infant in recovery period.         Problem: Bleeding (Labor)  Goal: Hemostasis  Outcome: Unable to Meet, Plan Revised     Problem: Change in Fetal Wellbeing (Labor)  Goal: Stable Fetal Wellbeing  Outcome: Unable to Meet, Plan Revised     Problem: Delayed Labor Progression (Labor)  Goal: Effective Progression to Delivery  Outcome: Unable to Meet, Plan Revised     Problem: Infection (Labor)  Goal: Absence of Infection Signs and Symptoms  Outcome: Unable to Meet, Plan Revised     Problem: Labor Pain (Labor)  Goal: Acceptable Pain Control  Outcome: Unable to Meet, Plan Revised     Problem: Uterine Tachysystole (Labor)  Goal: Normal Uterine Contraction Pattern  Outcome: Unable to Meet, Plan Revised

## 2023-05-12 NOTE — PLAN OF CARE
Problem: Adult Inpatient Plan of Care  Goal: Plan of Care Review  Outcome: Ongoing, Progressing  Flowsheets (Taken 5/12/2023 0543)  Outcome Evaluation: Pt admitted for IOL.  Cervidil to be removed at 0635. VSS, pain 0/10.  Goal: Patient-Specific Goal (Individualized)  Outcome: Ongoing, Progressing  Goal: Absence of Hospital-Acquired Illness or Injury  Outcome: Ongoing, Progressing  Goal: Optimal Comfort and Wellbeing  Outcome: Ongoing, Progressing  Goal: Readiness for Transition of Care  Outcome: Ongoing, Progressing     Problem: Bleeding (Labor)  Goal: Hemostasis  Outcome: Ongoing, Progressing     Problem: Change in Fetal Wellbeing (Labor)  Goal: Stable Fetal Wellbeing  Outcome: Ongoing, Progressing     Problem: Delayed Labor Progression (Labor)  Goal: Effective Progression to Delivery  Outcome: Ongoing, Progressing     Problem: Infection (Labor)  Goal: Absence of Infection Signs and Symptoms  Outcome: Ongoing, Progressing     Problem: Labor Pain (Labor)  Goal: Acceptable Pain Control  Outcome: Ongoing, Progressing     Problem: Uterine Tachysystole (Labor)  Goal: Normal Uterine Contraction Pattern  Outcome: Ongoing, Progressing   Goal Outcome Evaluation:              Outcome Evaluation: Pt admitted for IOL.  Cervidil to be removed at 0635. VSS, pain 0/10.

## 2023-05-13 LAB
BASOPHILS # BLD AUTO: 0.03 10*3/MM3 (ref 0–0.2)
BASOPHILS NFR BLD AUTO: 0.3 % (ref 0–1.5)
DEPRECATED RDW RBC AUTO: 46.1 FL (ref 37–54)
EOSINOPHIL # BLD AUTO: 0.05 10*3/MM3 (ref 0–0.4)
EOSINOPHIL NFR BLD AUTO: 0.5 % (ref 0.3–6.2)
ERYTHROCYTE [DISTWIDTH] IN BLOOD BY AUTOMATED COUNT: 13.8 % (ref 12.3–15.4)
HCT VFR BLD AUTO: 25.1 % (ref 34–46.6)
HGB BLD-MCNC: 8.6 G/DL (ref 12–15.9)
IMM GRANULOCYTES # BLD AUTO: 0.07 10*3/MM3 (ref 0–0.05)
IMM GRANULOCYTES NFR BLD AUTO: 0.7 % (ref 0–0.5)
LYMPHOCYTES # BLD AUTO: 2.15 10*3/MM3 (ref 0.7–3.1)
LYMPHOCYTES NFR BLD AUTO: 20.6 % (ref 19.6–45.3)
MCH RBC QN AUTO: 31.3 PG (ref 26.6–33)
MCHC RBC AUTO-ENTMCNC: 34.3 G/DL (ref 31.5–35.7)
MCV RBC AUTO: 91.3 FL (ref 79–97)
MONOCYTES # BLD AUTO: 0.67 10*3/MM3 (ref 0.1–0.9)
MONOCYTES NFR BLD AUTO: 6.4 % (ref 5–12)
NEUTROPHILS NFR BLD AUTO: 7.48 10*3/MM3 (ref 1.7–7)
NEUTROPHILS NFR BLD AUTO: 71.5 % (ref 42.7–76)
NRBC BLD AUTO-RTO: 0 /100 WBC (ref 0–0.2)
PLATELET # BLD AUTO: 170 10*3/MM3 (ref 140–450)
PMV BLD AUTO: 10.6 FL (ref 6–12)
RBC # BLD AUTO: 2.75 10*6/MM3 (ref 3.77–5.28)
WBC NRBC COR # BLD: 10.45 10*3/MM3 (ref 3.4–10.8)

## 2023-05-13 PROCEDURE — 85025 COMPLETE CBC W/AUTO DIFF WBC: CPT | Performed by: STUDENT IN AN ORGANIZED HEALTH CARE EDUCATION/TRAINING PROGRAM

## 2023-05-13 PROCEDURE — 25010000002 KETOROLAC TROMETHAMINE PER 15 MG: Performed by: STUDENT IN AN ORGANIZED HEALTH CARE EDUCATION/TRAINING PROGRAM

## 2023-05-13 RX ADMIN — SENNOSIDES 5 ML: 8.8 SYRUP ORAL at 22:28

## 2023-05-13 RX ADMIN — IBUPROFEN 600 MG: 200 SUSPENSION ORAL at 22:52

## 2023-05-13 RX ADMIN — ACETAMINOPHEN 975 MG: 325 SUSPENSION ORAL at 17:57

## 2023-05-13 RX ADMIN — KETOROLAC TROMETHAMINE 15 MG: 15 INJECTION, SOLUTION INTRAMUSCULAR; INTRAVENOUS at 01:02

## 2023-05-13 RX ADMIN — SENNOSIDES 8.8 MG: 8.8 SYRUP ORAL at 08:43

## 2023-05-13 RX ADMIN — DOCUSATE SODIUM LIQUID 50 MG: 100 LIQUID ORAL at 22:27

## 2023-05-13 RX ADMIN — ACETAMINOPHEN 1000 MG: 325 SUSPENSION ORAL at 04:57

## 2023-05-13 RX ADMIN — KETOROLAC TROMETHAMINE 15 MG: 15 INJECTION, SOLUTION INTRAMUSCULAR; INTRAVENOUS at 08:40

## 2023-05-13 RX ADMIN — KETOROLAC TROMETHAMINE 15 MG: 15 INJECTION, SOLUTION INTRAMUSCULAR; INTRAVENOUS at 14:42

## 2023-05-13 RX ADMIN — ACETAMINOPHEN 975 MG: 325 SUSPENSION ORAL at 10:56

## 2023-05-13 RX ADMIN — DOCUSATE SODIUM LIQUID 50 MG: 100 LIQUID ORAL at 08:45

## 2023-05-13 NOTE — LACTATION NOTE
Patient reports baby is BF well. Baby is BF every 3 hours at least. Patient reports baby having good tugging and latching. She denies questions at this time. Encouraged pt to review provided booklet on BF. She has OPLC info. Educated on milk coming in, cluster feeding. Pt has OPLC info. Encouraged to call LC as needed.    Lactation Consult Note    Evaluation Completed: 2023 08:04 EDT  Patient Name: Gianluca Leyva  :  2000  MRN:  [id number]     REFERRAL  INFORMATION:                                         DELIVERY HISTORY:        Skin to skin initiation date/time: 2023  4:11 PM   Skin to skin end date/time: 2023  4:25 PM        MATERNAL ASSESSMENT:                               INFANT ASSESSMENT:  Information for the patient's :  Radu Leyva [[id number]]   No past medical history on file.                                                                                                     MATERNAL INFANT FEEDING:                                                                       EQUIPMENT TYPE:                                 BREAST PUMPING:          LACTATION REFERRALS:                                       Lactation Consult Note    Evaluation Completed: 2023 08:03 EDT  Patient Name: Gianluca Leyva  :  2000  MRN:  [id number]     REFERRAL  INFORMATION:                                         DELIVERY HISTORY:        Skin to skin initiation date/time: 2023  4:11 PM   Skin to skin end date/time: 2023  4:25 PM        MATERNAL ASSESSMENT:                               INFANT ASSESSMENT:  Information for the patient's :  Radu Leyva [[id number]]   No past medical history on file.                                                                                                     MATERNAL INFANT FEEDING:                                                                       EQUIPMENT TYPE:                                 BREAST PUMPING:           LACTATION REFERRALS:

## 2023-05-13 NOTE — PROGRESS NOTES
2023    Name:Gianluca Leyva    MR#:[id number]     Progress Note:  Post-Op day 1 S/P    HD:2    Subjective   22 y.o. yo Female  s/p CS at 38w2d doing well. Pain well controlled. Tolerating regular diet and having flatus. Lochia normal. Zavala removed, has not yet voided    Patient Active Problem List   Diagnosis   • Osteogenesis imperfecta type I   • Family history of osteogenesis imperfecta   • 38 weeks gestation of pregnancy   • Vitamin D deficiency   • Carrier of spinal muscular atrophy   • Maternal anemia in pregnancy, antepartum   • Maternal varicella, non-immune   • Echogenic bowel of fetus on prenatal ultrasound   • Decreased fetal movement affecting management of pregnancy   •  delivery delivered        Objective    Vitals  Temp:  Temp:  [97.1 °F (36.2 °C)-98.8 °F (37.1 °C)] 98 °F (36.7 °C)  Temp src: Oral  BP:  BP: ()/(40-74) 100/64  Pulse:  Heart Rate:  [] 72  RR:   Resp:  [16-20] 16  Weight: 73.5 kg (162 lb)  BMI:  Body mass index is 26.96 kg/m².      General Awake, alert, no distress  Abdomen Soft, non-distended, fundus firm, 2 cm below umbilicus, appropriately tender  Incision  Left bandage in place, no strikethough  Extremities Calves NT bilaterally         I/O last 3 completed shifts:  In: 5405 [I.V.:5155; IV Piggyback:250]  Out: 1879 [Urine:1030; Blood:849]    LABS:   Lab Results   Component Value Date    WBC 10.45 2023    HGB 8.6 (L) 2023    HCT 25.1 (L) 2023    MCV 91.3 2023     2023       Infant: male       Assessment   1.  POD 1    Plan: Doing well.  Routine postoperative care  Offered circumcision but was notified that it was recommended to wait on circumcision  Varicella nonimmune and vaccination ordered PP      Decreased fetal movement affecting management of pregnancy    Osteogenesis imperfecta type I    38 weeks gestation of pregnancy    Vitamin D deficiency    Carrier of spinal muscular atrophy    Maternal  anemia in pregnancy, antepartum    Maternal varicella, non-immune    Echogenic bowel of fetus on prenatal ultrasound     delivery delivered      Chyna Bennett MD  2023 11:09 EDT

## 2023-05-13 NOTE — PROGRESS NOTES
I was scrubbed and actively participating as first assistant, including providing exposure, delivery assistance, hemostatic maneuvers and closure for Dr. Judge.    Martha Irvin MD

## 2023-05-14 VITALS
RESPIRATION RATE: 16 BRPM | BODY MASS INDEX: 26.99 KG/M2 | HEART RATE: 67 BPM | HEIGHT: 65 IN | OXYGEN SATURATION: 97 % | DIASTOLIC BLOOD PRESSURE: 65 MMHG | WEIGHT: 162 LBS | SYSTOLIC BLOOD PRESSURE: 112 MMHG | TEMPERATURE: 98.7 F

## 2023-05-14 PROCEDURE — 0503F POSTPARTUM CARE VISIT: CPT | Performed by: OBSTETRICS & GYNECOLOGY

## 2023-05-14 RX ORDER — DOCUSATE SODIUM 50 MG/5 ML
50 LIQUID (ML) ORAL 2 TIMES DAILY
Qty: 473 ML | Refills: 0 | Status: SHIPPED | OUTPATIENT
Start: 2023-05-14

## 2023-05-14 RX ORDER — OXYCODONE HCL 5 MG/5 ML
5 SOLUTION, ORAL ORAL EVERY 4 HOURS PRN
Qty: 25 ML | Refills: 0 | Status: SHIPPED | OUTPATIENT
Start: 2023-05-14 | End: 2023-05-19

## 2023-05-14 RX ORDER — ACETAMINOPHEN 160 MG/5ML
650 SUSPENSION ORAL EVERY 6 HOURS PRN
Qty: 473 ML | Refills: 1 | Status: SHIPPED | OUTPATIENT
Start: 2023-05-14

## 2023-05-14 RX ADMIN — ACETAMINOPHEN 650 MG: 325 SUSPENSION ORAL at 09:27

## 2023-05-14 RX ADMIN — ACETAMINOPHEN 650 MG: 325 SUSPENSION ORAL at 02:28

## 2023-05-14 RX ADMIN — SENNOSIDES 5 ML: 8.8 SYRUP ORAL at 09:30

## 2023-05-14 RX ADMIN — VARICELLA VIRUS VACCINE LIVE 0.5 ML: 1350 INJECTION, POWDER, LYOPHILIZED, FOR SUSPENSION SUBCUTANEOUS at 15:47

## 2023-05-14 RX ADMIN — IBUPROFEN 600 MG: 200 SUSPENSION ORAL at 05:05

## 2023-05-14 RX ADMIN — DOCUSATE SODIUM LIQUID 50 MG: 100 LIQUID ORAL at 09:29

## 2023-05-14 RX ADMIN — IBUPROFEN 400 MG: 200 SUSPENSION ORAL at 11:49

## 2023-05-14 NOTE — LACTATION NOTE
Patient reports baby is BF every 2- 3 hours. She denies questions. Educated on baby's expected output and weight gain. Pt has hospitals info. For f/u      Lactation Consult Note    Evaluation Completed: 2023 13:41 EDT  Patient Name: Gianluca Leyva  :  2000  MRN:  [id number]     REFERRAL  INFORMATION:                                         DELIVERY HISTORY:        Skin to skin initiation date/time: 2023  4:11 PM   Skin to skin end date/time: 2023  4:25 PM        MATERNAL ASSESSMENT:                               INFANT ASSESSMENT:  Information for the patient's :  Radu Leyva [[id number]]   No past medical history on file.                                                                                                     MATERNAL INFANT FEEDING:                                                                       EQUIPMENT TYPE:                                 BREAST PUMPING:          LACTATION REFERRALS:

## 2023-05-14 NOTE — PROGRESS NOTES
[facility]   Obstetrics and Gynecology     2023    Name:Gianluca Leyva    MR#:[id number]     Progress Note:  Post-Op    HD:3    Subjective   22 y.o. yo Female  s/p CS at 38w2d doing well. Pain well controlled. Tolerating regular diet and having flatus. Lochia normal.     Patient Active Problem List   Diagnosis   • Osteogenesis imperfecta type I   • Family history of osteogenesis imperfecta   • 38 weeks gestation of pregnancy   • Vitamin D deficiency   • Carrier of spinal muscular atrophy   • Maternal anemia in pregnancy, antepartum   • Maternal varicella, non-immune   • Echogenic bowel of fetus on prenatal ultrasound   • Decreased fetal movement affecting management of pregnancy   •  delivery delivered        Objective    Vitals  Temp:  Temp:  [97.5 °F (36.4 °C)-98.7 °F (37.1 °C)] 98.7 °F (37.1 °C)  Temp src: Oral  BP:  BP: (107-136)/(65-88) 112/65  Pulse:  Heart Rate:  [64-91] 67  RR:   Resp:  [16-18] 16  Weight: 73.5 kg (162 lb)  BMI:  Body mass index is 26.96 kg/m².    Physical Exam  Vitals and nursing note reviewed.   Constitutional:       Appearance: Normal appearance. She is normal weight.   Pulmonary:      Effort: Pulmonary effort is normal.   Abdominal:      General: There is no distension.      Palpations: Abdomen is soft.      Tenderness: There is no abdominal tenderness.      Comments: Inc C/D/I    Neurological:      Mental Status: She is alert and oriented to person, place, and time.   Psychiatric:         Mood and Affect: Mood normal.         Behavior: Behavior normal.         I/O last 3 completed shifts:  In: 2293 [I.V.:2293]  Out: 1580 [Urine:1580]    LABS:  Results from last 7 days   Lab Units 23  0458 23  1739   WBC 10*3/mm3 10.45 7.55   HEMOGLOBIN g/dL 8.6* 11.9*   HEMATOCRIT % 25.1* 33.7*   PLATELETS 10*3/mm3 170 235     Results from last 7 days   Lab Units 23  1739   SODIUM mmol/L 139   POTASSIUM mmol/L 3.7   CHLORIDE mmol/L  108*   CO2 mmol/L 20.3*   BUN mg/dL 7   CREATININE mg/dL 0.62   CALCIUM mg/dL 9.5   BILIRUBIN mg/dL <0.2   ALK PHOS U/L 124*   ALT (SGPT) U/L 9   AST (SGOT) U/L 12   GLUCOSE mg/dL 80       Infant: male       Assessment    1.  POD#2     Decreased fetal movement affecting management of pregnancy    Osteogenesis imperfecta type I    38 weeks gestation of pregnancy    Vitamin D deficiency    Carrier of spinal muscular atrophy    Maternal anemia in pregnancy, antepartum    Maternal varicella, non-immune    Echogenic bowel of fetus on prenatal ultrasound     delivery delivered      Plan:  Patient requests discharge  Infant circumcision:  Procedure reviewed with the patient including risk, benefits and elective nature of the procedure    Alysia Jimenes MD  2023 13:07 EDT

## 2023-05-14 NOTE — LACTATION NOTE
This note was copied from a baby's chart.  P1, T. Baby is cluster feeding,has good output and latching well when not frantic. Encouraged mom to calm baby before attempting to feed and reviewed cluster feeding and pgs 35-45 in Postpartum and  handbook, how to access videos and OPLC info. She has a PBP and will call LC if she needs any help tonight.

## 2023-05-14 NOTE — PLAN OF CARE
Goal Outcome Evaluation:  Plan of Care Reviewed With: patient           Outcome Evaluation: vss, ready for dc, varicella given, pain well controlled

## 2023-05-14 NOTE — DISCHARGE SUMMARY
[facility]   Obstetrics and Gynecology    Section Discharge Summary    Date of Admission: 2023  Date of Discharge:        Patient: Gianluca Leyva      MR#:[id number]    Surgeon/OB: Kim Judge MD    Discharge Diagnosis:    section at 38w2d, uncomplicated recovery    Decreased fetal movement affecting management of pregnancy    Osteogenesis imperfecta type I    38 weeks gestation of pregnancy    Vitamin D deficiency    Carrier of spinal muscular atrophy    Maternal anemia in pregnancy, antepartum    Maternal varicella, non-immune    Echogenic bowel of fetus on prenatal ultrasound     delivery delivered        Procedures:  , Low Transverse     2023    4:01 PM      Anesthesia:  Epidural     Hospital Course  Patient is a 22 y.o. female  at 38w2d status post  section with uneventful postoperative recovery.  Patient was advanced to regular diet on postoperative day#1.  On discharge, ambulating, tolerating a regular diet without any difficulties and her incision is dry, clean and intact.     Infant:   male  fetus 3110 g (6 lb 13.7 oz)  with Apgar scores of 8 , 9  at five minutes.    Condition on Discharge:  Stable    Vital Signs  Temp:  [97.5 °F (36.4 °C)-98.7 °F (37.1 °C)] 98.7 °F (37.1 °C)  Heart Rate:  [64-91] 67  Resp:  [16-18] 16  BP: (107-136)/(65-88) 112/65    Results from last 7 days   Lab Units 23  0458 23  1739   WBC 10*3/mm3 10.45 7.55   HEMOGLOBIN g/dL 8.6* 11.9*   HEMATOCRIT % 25.1* 33.7*   PLATELETS 10*3/mm3 170 235     Results from last 7 days   Lab Units 23  1739   SODIUM mmol/L 139   POTASSIUM mmol/L 3.7   CHLORIDE mmol/L 108*   CO2 mmol/L 20.3*   BUN mg/dL 7   CREATININE mg/dL 0.62   CALCIUM mg/dL 9.5   BILIRUBIN mg/dL <0.2   ALK PHOS U/L 124*   ALT (SGPT) U/L 9   AST (SGOT) U/L 12   GLUCOSE mg/dL 80         Discharge Disposition  Home or Self Care    Discharge Medications     Your medication list       START taking these medications      Instructions Last Dose Given Next Dose Due   acetaminophen 160 MG/5ML liquid  Commonly known as: TYLENOL      Take 20.3 mL by mouth Every 6 (Six) Hours As Needed (pain).       docusate sodium 50 mg/5 mL liquid  Commonly known as: COLACE      Take 5 mL by mouth 2 (Two) Times a Day.       ibuprofen 100 MG/5ML suspension  Commonly known as: ADVIL,MOTRIN      Take 30 mL by mouth Every 6 (Six) Hours As Needed for Mild Pain.       oxyCODONE 5 MG/5ML solution  Commonly known as: ROXICODONE      Take 5 mL by mouth Every 4 (Four) Hours As Needed for Moderate Pain for up to 5 days.          CONTINUE taking these medications      Instructions Last Dose Given Next Dose Due   PA VITAMIN D-3 GUMMY PO      Take  by mouth.       PRENATAL ADULT GUMMY/DHA/FA PO      Take 1 tablet by mouth Daily.       valACYclovir 1000 MG tablet  Commonly known as: VALTREX      TAKE 2 TABLETS BY MOUTH 2 TIMES EVERY DAY FOR 1 DAY             Where to Get Your Medications      These medications were sent to [facility] [place] [id number]    Hours: 7:00 AM-6:00 PM Mon-Fri, 8:00 AM-4:30 PM Sat-Sun (Closed 12-12:30PM) Phone: [phone]   · acetaminophen 160 MG/5ML liquid  · docusate sodium 50 mg/5 mL liquid  · ibuprofen 100 MG/5ML suspension  · oxyCODONE 5 MG/5ML solution           Discharge Diet: Regular    Follow-up Appointments  Future Appointments   Date Time Provider Department Center   7/31/2023  1:45 PM Sherlyn Mcpherson PA [facility]     Additional Instructions for the Follow-ups that You Need to Schedule     Call MD for problems / concerns.   As directed      Instructions: Call for temp>101, vaginal bleeding greater than one pad/hour, severe pain or other concerns.    Order Comments: Instructions: Call for temp>101, vaginal bleeding greater than one pad/hour, severe pain or other concerns.          Discharge Follow-up with Specialty: DR. Judge; 1 Week   As  directed      Specialty: DR. Judge    Follow Up: 1 Week               Prenatal labs/vax:   Immunization History   Administered Date(s) Administered   • FluLaval/Fluzone >6mos 10/12/2022   • Tdap 03/02/2023   • flucelvax quad pfs =>4 YRS 01/15/2020       External Prenatal Results     Pregnancy Outside Results - Transcribed From Office Records - See Scanned Records For Details     Test Value Date Time    ABO  O  05/11/23 1739    Rh  Positive  05/11/23 1739    Antibody Screen  Negative  05/11/23 1739       Negative  10/12/22 0940    Varicella IgG  153 index 04/26/23 1259    Rubella  3.33 index 10/12/22 0940    Hgb  8.6 g/dL 05/13/23 0458       11.9 g/dL 05/11/23 1739       11.4 g/dL 04/26/23 1259       10.3 g/dL 03/30/23 1328       10.8 g/dL 03/02/23 1108       13.1 g/dL 11/02/22 1104       CANCELED g/dL 10/12/22 0940    Hct  25.1 % 05/13/23 0458       33.7 % 05/11/23 1739       33.6 % 04/26/23 1259       29.7 % 03/30/23 1328       31.0 % 03/02/23 1108       37.9 % 11/02/22 1104       CANCELED % 10/12/22 0940    Glucose Fasting GTT       Glucose Tolerance Test 1 hour       Glucose Tolerance Test 3 hour       Gonorrhea (discrete)  Negative  10/12/22 0944    Chlamydia (discrete)  Negative  10/12/22 0944    RPR  Non Reactive  10/12/22 0940    VDRL       Syphilis Antibody       HBsAg  Negative  10/12/22 0940    Herpes Simplex Virus PCR       Herpes Simplex VIrus Culture       HIV  Non Reactive  10/12/22 0940    Hep C RNA Quant PCR       Hep C Antibody  <0.1 s/co ratio 10/12/22 0940    AFP  64.0 ng/mL 12/27/22 1419    Group B Strep  Negative  04/26/23 1352    GBS Susceptibility to Clindamycin       GBS Susceptibility to Erythromycin       Fetal Fibronectin       Genetic Testing, Maternal Blood             Drug Screening     Test Value Date Time    Urine Drug Screen       Amphetamine Screen       Barbiturate Screen       Benzodiazepine Screen       Methadone Screen       Phencyclidine Screen       Opiates Screen       THC  Screen       Cocaine Screen       Propoxyphene Screen       Buprenorphine Screen       Methamphetamine Screen       Oxycodone Screen       Tricyclic Antidepressants Screen             Legend    ^: Historical                          Alysia Jimenes MD  5/14/2023  13:08 EDT

## 2023-05-15 ENCOUNTER — PATIENT MESSAGE (OUTPATIENT)
Dept: OBSTETRICS AND GYNECOLOGY | Age: 23
End: 2023-05-15
Payer: COMMERCIAL

## 2023-05-15 NOTE — TELEPHONE ENCOUNTER
From: Gianluca Leyva  To: Kim Judge  Sent: 5/15/2023 1:21 PM EDT  Subject: post partum concern    good afternoon ,     I know they had mentioned awareness of passing clots I just wanted more understanding on how to handle those situations. I have passed two after being home 1 being small / minuscule and another I would say larger than a quarter. Just checking to know how to monitor and watch for certain things.   Thank you in advance

## 2023-05-16 LAB
B19V IGG SER IA-ACNC: 0.1 INDEX (ref 0–0.8)
B19V IGM SER IA-ACNC: 0.1 INDEX (ref 0–0.8)

## 2023-05-22 ENCOUNTER — POSTPARTUM VISIT (OUTPATIENT)
Dept: OBSTETRICS AND GYNECOLOGY | Age: 23
End: 2023-05-22
Payer: COMMERCIAL

## 2023-05-22 VITALS
DIASTOLIC BLOOD PRESSURE: 60 MMHG | WEIGHT: 146 LBS | BODY MASS INDEX: 24.32 KG/M2 | SYSTOLIC BLOOD PRESSURE: 110 MMHG | HEIGHT: 65 IN

## 2023-05-22 DIAGNOSIS — O09.899 MATERNAL VARICELLA, NON-IMMUNE: ICD-10-CM

## 2023-05-22 DIAGNOSIS — Z28.39 MATERNAL VARICELLA, NON-IMMUNE: ICD-10-CM

## 2023-05-22 PROBLEM — Z82.79 FAMILY HISTORY OF OSTEOGENESIS IMPERFECTA: Status: RESOLVED | Noted: 2022-10-21 | Resolved: 2023-05-22

## 2023-05-22 PROBLEM — O99.019 MATERNAL ANEMIA IN PREGNANCY, ANTEPARTUM: Status: RESOLVED | Noted: 2023-03-06 | Resolved: 2023-05-22

## 2023-05-22 PROBLEM — Z3A.38 38 WEEKS GESTATION OF PREGNANCY: Status: RESOLVED | Noted: 2022-11-02 | Resolved: 2023-05-22

## 2023-05-22 PROBLEM — O36.8190 DECREASED FETAL MOVEMENT AFFECTING MANAGEMENT OF PREGNANCY: Status: RESOLVED | Noted: 2023-05-11 | Resolved: 2023-05-22

## 2023-05-22 PROBLEM — O28.3 ECHOGENIC BOWEL OF FETUS ON PRENATAL ULTRASOUND: Status: RESOLVED | Noted: 2023-05-11 | Resolved: 2023-05-22

## 2023-05-22 NOTE — PROGRESS NOTES
"[facility]   Obstetrics and Gynecology   Postpartum Visit    2023    Patient: Gianluca Leyva          MR#:[id number]    History of Present Illness    Chief Complaint   Patient presents with   • Postpartum Care     PP 10 days C/S 2023 male \"Arley\", Breast feeding, No problems today       22 y.o. female  status post PCS for NRFS 23 presents for postpartum visit without complaints.  Mood stable.  Breast-feeding without issue.  Bleeding minimal.  No intercourse since delivery.  Baby Arley is doing great.  Going to peds.  His OI testing is pending.    Contraception: Nexplanon  Vaccines: RI, s/p varivax postpartum, s/p tdap  Pap: n/a  ________________________________________  Patient Active Problem List   Diagnosis   • Osteogenesis imperfecta type I   • Vitamin D deficiency   • Carrier of spinal muscular atrophy   • Maternal varicella, non-immune   •  delivery delivered       Past Medical History:   Diagnosis Date   • Anemia in pregnancy    • Osteogenesis imperfecta        Past Surgical History:   Procedure Laterality Date   •  SECTION N/A 2023    Procedure:  SECTION PRIMARY;  Surgeon: Kim Judge MD;  Location: [facility] LABOR DELIVERY;  Service: Obstetrics/Gynecology;  Laterality: N/A;   • ELBOW ARTHROPLASTY     • KNEE ARTHROPLASTY     • MUSCLE REPAIR      shoulder   • SHOULDER ARTHROSCOPY         Social History     Tobacco Use   Smoking Status Never   • Passive exposure: Never   Smokeless Tobacco Never       has a current medication list which includes the following prescription(s): cholecalciferol, ibuprofen, prenatal mv & min w/fa-dha, valacyclovir, acetaminophen, and docusate sodium.  ________________________________________         Review of Systems   All other systems reviewed and are negative.      Objective     /60   Ht 165.1 cm (65\")   Wt 66.2 kg (146 lb)   Breastfeeding Yes   BMI 24.30 kg/m²    BP Readings from Last 3 " "Encounters:   23 110/60   23 112/65   23 110/62      Wt Readings from Last 3 Encounters:   23 66.2 kg (146 lb)   23 73.5 kg (162 lb)   23 73.6 kg (162 lb 3.2 oz)      BMI: Estimated body mass index is 24.3 kg/m² as calculated from the following:    Height as of this encounter: 165.1 cm (65\").    Weight as of this encounter: 66.2 kg (146 lb).    EXAM     General:     Patient appears well in NAD  Respiratory: Normal effort, no distress  Breast:  declined  Abdomen: Soft, NT, no acute findings, incision c/d/i  Pelvic:  deferred  Ext:  No cyanosis or edema    Assessment:    Diagnoses and all orders for this visit:    1. Postpartum follow-up (Primary)    2. Maternal varicella, non-immune    3.  delivery delivered       - Doing great postpartum, no acute issues  - Discussed all contraceptive options.  Patient would like Nexplanon.  Discussed avoiding unprotected intercourse until device is in place 1 week.  Discussed expectation of irregular menses after insertion.    Plan:  Return in about 5 weeks (around 2023) for Check Nexplanon benefits, schedule insertion with me at postpartum visit.      Kim Judge MD  2023 11:49 EDT  "

## 2023-08-14 ENCOUNTER — PATIENT MESSAGE (OUTPATIENT)
Dept: OBSTETRICS AND GYNECOLOGY | Age: 23
End: 2023-08-14
Payer: COMMERCIAL

## 2023-08-15 NOTE — TELEPHONE ENCOUNTER
From: Gianluca Leyva  To: Kim Judge  Sent: 8/14/2023 4:41 PM EDT  Subject: Nexplanon    good afternoon ,     I just wanted to follow up about my birth control.    I belive I had my first period with the bar & it had concluded a few days then I began to bleed again going for my 2nd week of this kind of dark brown blood.     I'm not sure if it is due to the birth control hormones or postpartum, I think the thing that su my questioning of it is the type of color of the blood and texture.     Should we give it time or try to correct it with birth control pills in addition ? The irregular spots and bleeding is something I'd like to hopefully get controlled rather than keeping the bar.    Thank you in advance !

## 2024-02-07 ENCOUNTER — OFFICE VISIT (OUTPATIENT)
Dept: OBSTETRICS AND GYNECOLOGY | Age: 24
End: 2024-02-07
Payer: COMMERCIAL

## 2024-02-07 VITALS
HEIGHT: 65 IN | DIASTOLIC BLOOD PRESSURE: 74 MMHG | BODY MASS INDEX: 25.83 KG/M2 | SYSTOLIC BLOOD PRESSURE: 118 MMHG | WEIGHT: 155 LBS

## 2024-02-07 DIAGNOSIS — Z11.3 SCREEN FOR STD (SEXUALLY TRANSMITTED DISEASE): ICD-10-CM

## 2024-02-07 DIAGNOSIS — N89.8 VAGINAL DISCHARGE: Primary | ICD-10-CM

## 2024-02-07 PROCEDURE — 99213 OFFICE O/P EST LOW 20 MIN: CPT | Performed by: PHYSICIAN ASSISTANT

## 2024-02-07 RX ORDER — ETONOGESTREL 68 MG/1
1 IMPLANT SUBCUTANEOUS ONCE
COMMUNITY

## 2024-02-07 NOTE — PROGRESS NOTES
"Subjective     Chief Complaint   Patient presents with    Gynecologic Exam     C/o vaginal discharge and odor, would also like to discuss the nexplanon       Gianluca Leyva is a 23 y.o.  whose LMP is Patient's last menstrual period was 2024 (approximate). presents with vaginal d/c    She is noting an odor and d/c  Present the week before her period    Is also noting irregular bleeding with her nexplanon  Has had weight gain as well  Unsure if she wants to c/w it  Would be interested in a pill or IUD  Not ready to do anything today    Baby is good      No Additional Complaints Reported    The following portions of the patient's history were reviewed and updated as appropriate:no additional history reviewed, vital signs, allergies, current medications, past medical history, past social history, past surgical history, and problem list      Review of Systems   Genitourinary:positive for vaginal discharge and weight gain     Objective      /74   Ht 165.1 cm (65\")   Wt 70.3 kg (155 lb)   LMP 2024 (Approximate)   BMI 25.79 kg/m²     Physical Exam    General:   alert, comfortable, and no distress   Heart: Not performed today   Lungs: Not performed today.   Breast: Not performed today   Neck: Not performed today   Abdomen: Not performed today   CVA: Not performed today   Pelvis: External genitalia: normal general appearance  Vaginal: normal mucosa without prolapse or lesions  Cervix: normal appearance and GC prep obtained   Extremities: Not performed today   Neurologic: negative   Psychiatric: Normal affect, judgement, and mood       Lab Review   Labs: No data reviewed    Imaging   No data reviewed    Assessment & Plan     ASSESSMENT  1. Vaginal discharge    2. Screen for STD (sexually transmitted disease)          PLAN  1.   Orders Placed This Encounter   Procedures    NuSwab VG+ - Swab, Vagina       2. Await swab prior to treating.      3. Disc BC options. She will try the sample of nextsellis " and let me know if she wishes to c/w this. Will sign PW for IUD, could place at any time.      Follow up: SOLIS Combs  2/7/2024

## 2024-02-09 LAB
A VAGINAE DNA VAG QL NAA+PROBE: ABNORMAL SCORE
BVAB2 DNA VAG QL NAA+PROBE: ABNORMAL SCORE
C ALBICANS DNA VAG QL NAA+PROBE: NEGATIVE
C GLABRATA DNA VAG QL NAA+PROBE: NEGATIVE
C TRACH DNA VAG QL NAA+PROBE: NEGATIVE
MEGA1 DNA VAG QL NAA+PROBE: ABNORMAL SCORE
N GONORRHOEA DNA VAG QL NAA+PROBE: NEGATIVE
T VAGINALIS DNA VAG QL NAA+PROBE: NEGATIVE

## 2024-02-12 RX ORDER — METRONIDAZOLE 500 MG/1
500 TABLET ORAL 2 TIMES DAILY
Qty: 14 TABLET | Refills: 0 | Status: SHIPPED | OUTPATIENT
Start: 2024-02-12 | End: 2024-02-19

## 2024-04-16 ENCOUNTER — OFFICE VISIT (OUTPATIENT)
Dept: OBSTETRICS AND GYNECOLOGY | Age: 24
End: 2024-04-16
Payer: COMMERCIAL

## 2024-04-16 VITALS
HEIGHT: 65 IN | DIASTOLIC BLOOD PRESSURE: 76 MMHG | SYSTOLIC BLOOD PRESSURE: 120 MMHG | BODY MASS INDEX: 27.82 KG/M2 | WEIGHT: 167 LBS

## 2024-04-16 DIAGNOSIS — Z30.46 NEXPLANON REMOVAL: Primary | ICD-10-CM

## 2024-04-16 PROCEDURE — 11982 REMOVE DRUG IMPLANT DEVICE: CPT | Performed by: PHYSICIAN ASSISTANT

## 2024-04-16 NOTE — PROGRESS NOTES
Nexplanon Removal Procedure Note    Pre-operative Diagnosis: Nexplanon complication/weight gain     Post-operative Diagnosis: same    Indications: same    Procedure Details   The risks (including infection, bruising, irregular bleeding, pain at removal site, and injury to muscles, nerves and blood vessels) and benefits of the procedure were explained to the patient and/or guardian and written informed consent was obtained.      Nexplanon device was easily located by palpation of inner arm.  The device insertion site was painted with betadine and allowed to dry.  Device site anesthetized with 3 ml 2% lidocaine with epi.  End of device was located and 11 blade was used to make small incision.  Device was located in subcutaneous  tissue, grasped with hemostat and removed intact. Incision site was closed with steri-strips and band aid.   Pressure dressing applied.  There were no complications.      Pt tolerated procedure well      Condition:  Stable    Complications:  None    Plan:    The patient was advised to call for any rash, arm pain, fever, warmth or for prolonged bruising or bleeding. She was advised to use OTC acetaminophen as needed for mild to moderate pain.   Can remove pressure dressing in 24 hours. Band aid and steri strips in 2-3 days    C/w abstinence until placement of her iud  Time out was taken prior to removal of nexplanon

## 2024-06-25 ENCOUNTER — OFFICE VISIT (OUTPATIENT)
Dept: OBSTETRICS AND GYNECOLOGY | Age: 24
End: 2024-06-25
Payer: COMMERCIAL

## 2024-06-25 VITALS
SYSTOLIC BLOOD PRESSURE: 110 MMHG | DIASTOLIC BLOOD PRESSURE: 74 MMHG | BODY MASS INDEX: 27.49 KG/M2 | WEIGHT: 165 LBS | HEIGHT: 65 IN

## 2024-06-25 DIAGNOSIS — Z30.430 ENCOUNTER FOR IUD INSERTION: Primary | ICD-10-CM

## 2024-06-25 LAB
B-HCG UR QL: NEGATIVE
EXPIRATION DATE: NORMAL
INTERNAL NEGATIVE CONTROL: NEGATIVE
INTERNAL POSITIVE CONTROL: POSITIVE
Lab: NORMAL

## 2024-06-25 PROCEDURE — 58300 INSERT INTRAUTERINE DEVICE: CPT | Performed by: NURSE PRACTITIONER

## 2024-06-25 PROCEDURE — 81025 URINE PREGNANCY TEST: CPT | Performed by: NURSE PRACTITIONER

## 2024-06-25 NOTE — PROGRESS NOTES
IUD Insertion    Patient's last menstrual period was 06/21/2024.    Date of procedure:  6/25/2024    Risks and benefits discussed? yes  All questions answered? yes  Consents given by The patient  Written consent obtained? yes    Procedure documentation:     Urine pregnancy test was done and was NEGATIVE .  The risks (including infection,  bleeding, pain, and uterine perforation) and benefits of the procedure were  explained to the patient and Written informed consent was  obtained.    After verifying the patient had a low probability of being pregnant and met the criteria for insertion, a sterile speculum has placed and the cervix was cleansed with an antiseptic solution.  Vaginal discharge was scant.  The anterior lip of the cervix was grasped with an allis and the uterine cavity was gently sounded. There was no difficulty passing the sound through the cervix.  Cervical dilation did not need to be performed prior to placing the IUD.  The uterus was anteverted and sounded to 8 cms.  The Kyleena was then prepared per the manufacturers instructions.    The Kyleena was advanced to a point 2 cms from the fundus and then the arms were released from the sheath.  The device was advanced to the fundus and the device was released fully from the sheath.. The string was cut 3 cms in length.  Bleeding from the cervix was scant.    She tolerated the procedure without any difficulty.    Post procedure instructions: The patient was advised to call for any fever or for  prolonged or severe pain or bleeding. Pelvic rest  advised for 2 wks    Follow up needed: 6 weeks for IUD check

## 2024-07-08 RX ORDER — METRONIDAZOLE 500 MG/1
500 TABLET ORAL 2 TIMES DAILY
Qty: 14 TABLET | Refills: 0 | Status: SHIPPED | OUTPATIENT
Start: 2024-07-08 | End: 2024-07-10

## 2024-07-10 ENCOUNTER — OFFICE VISIT (OUTPATIENT)
Dept: OBSTETRICS AND GYNECOLOGY | Age: 24
End: 2024-07-10
Payer: COMMERCIAL

## 2024-07-10 VITALS
SYSTOLIC BLOOD PRESSURE: 114 MMHG | BODY MASS INDEX: 27.82 KG/M2 | HEIGHT: 65 IN | WEIGHT: 167 LBS | DIASTOLIC BLOOD PRESSURE: 74 MMHG

## 2024-07-10 DIAGNOSIS — Z01.419 ENCOUNTER FOR GYNECOLOGICAL EXAMINATION WITHOUT ABNORMAL FINDING: ICD-10-CM

## 2024-07-10 DIAGNOSIS — E55.9 VITAMIN D DEFICIENCY: ICD-10-CM

## 2024-07-10 DIAGNOSIS — Z23 NEED FOR HPV VACCINATION: ICD-10-CM

## 2024-07-10 DIAGNOSIS — Z01.419 WELL WOMAN EXAM WITH ROUTINE GYNECOLOGICAL EXAM: Primary | ICD-10-CM

## 2024-07-10 DIAGNOSIS — Z11.3 SCREEN FOR STD (SEXUALLY TRANSMITTED DISEASE): ICD-10-CM

## 2024-07-10 DIAGNOSIS — Z12.4 SCREENING FOR CERVICAL CANCER: ICD-10-CM

## 2024-07-10 RX ORDER — METRONIDAZOLE 7.5 MG/G
GEL VAGINAL
Qty: 70 G | Refills: 0 | Status: SHIPPED | OUTPATIENT
Start: 2024-07-10

## 2024-07-10 NOTE — PROGRESS NOTES
[facility]   Obstetrics and Gynecology   Routine Annual Visit    7/10/2024    Patient: Gianluca Leyva          MR#:[id number]    History of Present Illness    Chief Complaint   Patient presents with    Gynecologic Exam     Annual Exam - no pap hx, pt has kyleena IUD inserted 6/15/24 & states she has had some spontaneous abdominal cramping, pt would like to discuss this today       24 y.o. female  who presents for annual exam.  Kyleena IUD inserted 6/15/2024.  She has had some mild cramping since insertion.  Spotted for the first few days and then started her menses today.  She has noticed a fishy vaginal odor after her last few menses.  She was prescribed Flagyl p.o. empirically but has not taken it yet.  She does started menses yesterday.    Patient has a history of vitamin D deficiency and has not been taking her supplement lately.  Recheck vitamin D level today.    Patient has never had pap nor Gardasil vaccine.  She is amenable to both today.      Obstetric History:  OB History          1    Para   1    Term   1       0    AB   0    Living   1         SAB   0    IAB   0    Ectopic   0    Molar   0    Multiple   0    Live Births   1               Menstrual History:     Patient's last menstrual period was 2024 (exact date).       Sexual History:   Sexually active with male partner, desires STD screening      Social History:   Working as an MA at [employer]  Planning to start online school later this year for Organizational Leadership (possible future practice manager??)    ________________________________________  Patient Active Problem List   Diagnosis    Osteogenesis imperfecta type I    Vitamin D deficiency    Carrier of spinal muscular atrophy    Maternal varicella, non-immune     Past Medical History:   Diagnosis Date    Anemia in pregnancy     Osteogenesis imperfecta     Osteoporosis osteogenesis imperfecta     Past Surgical History:   Procedure Laterality Date     " SECTION N/A 2023    Procedure:  SECTION PRIMARY;  Surgeon: Kim Judge MD;  Location: [facility] LABOR DELIVERY;  Service: Obstetrics/Gynecology;  Laterality: N/A;    ELBOW ARTHROPLASTY      KNEE ARTHROPLASTY      MUSCLE REPAIR      shoulder    SHOULDER ARTHROSCOPY       Social History     Tobacco Use   Smoking Status Never    Passive exposure: Never   Smokeless Tobacco Never     Family History   Problem Relation Age of Onset    Hypertension Father     Osteogenesis imperfecta Mother     Osteogenesis imperfecta Brother     Osteogenesis imperfecta Brother     Breast cancer Neg Hx     Ovarian cancer Neg Hx     Uterine cancer Neg Hx     Colon cancer Neg Hx      Prior to Admission medications    Medication Sig Start Date End Date Taking? Authorizing Provider   ibuprofen (ADVIL,MOTRIN) 100 MG/5ML suspension Take 30 mL by mouth Every 6 (Six) Hours As Needed for Mild Pain. 23  Yes Alysia Jimenes MD   levonorgestrel (Kyleena) 19.5 MG intrauterine device IUD To be inserted by provider one time by Intrauterine route. 24  Yes Corinna Reynoso MD   metroNIDAZOLE (Flagyl) 500 MG tablet Take 1 tablet by mouth 2 (Two) Times a Day for 7 days. 7/8/24 7/15/24 Yes Sherlyn Mcpherson PA     ________________________________________    Current contraception: IUD  History of abnormal Pap smear: no  Family history of uterine or ovarian cancer: no  Family History of colon cancer/colon polyps: no  History of abnormal mammogram: no    The following portions of the patient's history were reviewed and updated as appropriate: allergies, current medications, past family history, past medical history, past social history, past surgical history, and problem list.    Review of Systems   All other systems reviewed and are negative.           Objective     /74   Ht 165.1 cm (65\")   Wt 75.8 kg (167 lb)   LMP 2024 (Exact Date)   BMI 27.79 kg/m²    BP Readings from Last 3 Encounters: " "  07/10/24 114/74   06/25/24 110/74   04/16/24 120/76      Wt Readings from Last 3 Encounters:   07/10/24 75.8 kg (167 lb)   06/25/24 74.8 kg (165 lb)   04/16/24 75.8 kg (167 lb)        BMI: Estimated body mass index is 27.79 kg/m² as calculated from the following:    Height as of this encounter: 165.1 cm (65\").    Weight as of this encounter: 75.8 kg (167 lb).    Physical Exam  Vitals and nursing note reviewed.   Constitutional:       General: She is not in acute distress.     Appearance: Normal appearance.   HENT:      Head: Normocephalic and atraumatic.   Eyes:      Extraocular Movements: Extraocular movements intact.   Cardiovascular:      Rate and Rhythm: Normal rate and regular rhythm.      Pulses: Normal pulses.      Heart sounds: No murmur heard.  Pulmonary:      Effort: Pulmonary effort is normal. No respiratory distress.      Breath sounds: Normal breath sounds.   Chest:   Breasts:     Right: Normal. No mass, nipple discharge, skin change or tenderness.      Left: Normal. No mass, nipple discharge, skin change or tenderness.   Abdominal:      General: There is no distension.      Palpations: Abdomen is soft. There is no mass.      Tenderness: There is no abdominal tenderness.   Genitourinary:     General: Normal vulva.      Labia:         Right: No rash or lesion.         Left: No rash or lesion.       Urethra: No prolapse, urethral swelling or urethral lesion.      Vagina: Normal.      Cervix: Normal.      Uterus: Normal.       Adnexa: Right adnexa normal and left adnexa normal.      Comments: Bladder: no masses or tenderness  Perineum/Anus: no masses, lesions, or skin changes    IUD strings visible at os  Musculoskeletal:         General: No swelling. Normal range of motion.      Cervical back: Normal range of motion.   Lymphadenopathy:      Upper Body:      Right upper body: No axillary adenopathy.      Left upper body: No axillary adenopathy.   Skin:     General: Skin is warm and dry.   Neurological: "      General: No focal deficit present.      Mental Status: She is alert and oriented to person, place, and time.   Psychiatric:         Mood and Affect: Mood normal.         Behavior: Behavior normal.         As part of wellness and prevention, the following topics were discussed with the patient:  Encouraged self breast exam  Physical activity and regular exercised encouraged.   Injury prevention discussed.  Healthy weight discussed.  Nutrition discussed.  Substance abuse/misuse discussed.  Sexual behavior/safe practices discussed.   Sexual transmitted disease prevention   Contraception discussed.   Mental health discussed.   Vaccinations/immunizations addressed.             Assessment:  Diagnoses and all orders for this visit:    1. Well woman exam with routine gynecological exam (Primary)  -     CBC (No Diff)  -     Comprehensive Metabolic Panel  -     Vitamin D 25 Hydroxy  -     HIV-1 / O / 2 Ag / Antibody  -     RPR, Rfx Qn RPR / Confirm TP  -     IGP,CtNgTv,rfx Apt HPV All    2. Encounter for gynecological examination without abnormal finding    3. Screen for STD (sexually transmitted disease)  -     HIV-1 / O / 2 Ag / Antibody  -     RPR, Rfx Qn RPR / Confirm TP  -     IGP,CtNgTv,rfx Apt HPV All    4. Screening for cervical cancer  -     IGP,CtNgTv,rfx Apt HPV All    5. Vitamin D deficiency  Overview:  -vit D improved to 31  -moderately compliant with vit D 67913 IU weekly but now taking gummies more consistently  -managed by Endo    Orders:  -     CBC (No Diff)  -     Comprehensive Metabolic Panel  -     Vitamin D 25 Hydroxy    6. Need for HPV vaccination  -     HPV 9-Valent Recomb Vaccine suspension 1 dose    Other orders  -     metroNIDAZOLE (METROGEL) 0.75 % vaginal gel; Insert one applicator-full (5 gm) into vagina nightly for 5 days.  Dispense: 70 g; Refill: 0      -Breast and pelvic exam normal  - Pap today  - STD screen today  - Repeat vitamin D level ordered  - Wellness labs ordered  - Patient  interested in Gardasil vaccine.  First dose administered today.  Plan for second dose in 2 months and final dose 4 months later.  - MetroGel sent for empiric treatment of BV post menses.    Plan:  Return for gardasil #2 in 2 mo and #3 4 mo later, annual in 1 yr.      Kim Judge MD  7/10/2024 13:32 EDT

## 2024-07-11 LAB
25(OH)D3+25(OH)D2 SERPL-MCNC: 18.9 NG/ML (ref 30–100)
ALBUMIN SERPL-MCNC: 4.5 G/DL (ref 4–5)
ALP SERPL-CCNC: 81 IU/L (ref 44–121)
ALT SERPL-CCNC: 10 IU/L (ref 0–32)
AST SERPL-CCNC: 18 IU/L (ref 0–40)
BILIRUB SERPL-MCNC: <0.2 MG/DL (ref 0–1.2)
BUN SERPL-MCNC: 10 MG/DL (ref 6–20)
BUN/CREAT SERPL: 14 (ref 9–23)
CALCIUM SERPL-MCNC: 9.5 MG/DL (ref 8.7–10.2)
CHLORIDE SERPL-SCNC: 103 MMOL/L (ref 96–106)
CO2 SERPL-SCNC: 23 MMOL/L (ref 20–29)
CREAT SERPL-MCNC: 0.71 MG/DL (ref 0.57–1)
EGFRCR SERPLBLD CKD-EPI 2021: 123 ML/MIN/1.73
ERYTHROCYTE [DISTWIDTH] IN BLOOD BY AUTOMATED COUNT: 12.3 % (ref 11.7–15.4)
GLOBULIN SER CALC-MCNC: 2.7 G/DL (ref 1.5–4.5)
GLUCOSE SERPL-MCNC: 83 MG/DL (ref 70–99)
HCT VFR BLD AUTO: 39.8 % (ref 34–46.6)
HGB BLD-MCNC: 13.1 G/DL (ref 11.1–15.9)
HIV 1+2 AB+HIV1 P24 AG SERPL QL IA: NON REACTIVE
MCH RBC QN AUTO: 30.2 PG (ref 26.6–33)
MCHC RBC AUTO-ENTMCNC: 32.9 G/DL (ref 31.5–35.7)
MCV RBC AUTO: 92 FL (ref 79–97)
PLATELET # BLD AUTO: 310 X10E3/UL (ref 150–450)
POTASSIUM SERPL-SCNC: 4.3 MMOL/L (ref 3.5–5.2)
PROT SERPL-MCNC: 7.2 G/DL (ref 6–8.5)
RBC # BLD AUTO: 4.34 X10E6/UL (ref 3.77–5.28)
RPR SER QL: NON REACTIVE
SODIUM SERPL-SCNC: 138 MMOL/L (ref 134–144)
WBC # BLD AUTO: 5.9 X10E3/UL (ref 3.4–10.8)

## 2024-07-12 LAB
C TRACH RRNA CVX QL NAA+PROBE: NEGATIVE
CONV .: NORMAL
CYTOLOGIST CVX/VAG CYTO: NORMAL
CYTOLOGY CVX/VAG DOC CYTO: NORMAL
CYTOLOGY CVX/VAG DOC THIN PREP: NORMAL
DX ICD CODE: NORMAL
Lab: NORMAL
N GONORRHOEA RRNA CVX QL NAA+PROBE: NEGATIVE
OTHER STN SPEC: NORMAL
STAT OF ADQ CVX/VAG CYTO-IMP: NORMAL
T VAGINALIS RRNA SPEC QL NAA+PROBE: NEGATIVE

## 2024-07-12 NOTE — PROGRESS NOTES
Please notify patient: STD panel was negative.  I am still waiting on Pap and gonorrhea/chlamydia/trichomonas test.  Blood counts and electrolytes are normal.  Vitamin D is low so I do recommend supplement as we discussed.  Thank you!    Kim Judge MD  7/12/2024  14:25 EDT

## 2024-07-12 NOTE — PROGRESS NOTES
Please call patient: Pap smear is normal. I recommend repeating in 3 years.  STD screen negative.  Thank you!    Kim Judge MD  7/12/2024  16:40 EDT

## 2024-07-25 RX ORDER — VALACYCLOVIR HYDROCHLORIDE 1 G/1
1000 TABLET, FILM COATED ORAL 2 TIMES DAILY
Qty: 60 TABLET | Refills: 3 | Status: SHIPPED | OUTPATIENT
Start: 2024-07-25

## 2024-08-27 ENCOUNTER — OFFICE VISIT (OUTPATIENT)
Dept: OBSTETRICS AND GYNECOLOGY | Age: 24
End: 2024-08-27
Payer: COMMERCIAL

## 2024-08-27 VITALS — SYSTOLIC BLOOD PRESSURE: 122 MMHG | BODY MASS INDEX: 27.46 KG/M2 | WEIGHT: 165 LBS | DIASTOLIC BLOOD PRESSURE: 74 MMHG

## 2024-08-27 DIAGNOSIS — N89.8 VAGINAL DISCHARGE: Primary | ICD-10-CM

## 2024-08-27 PROCEDURE — 99213 OFFICE O/P EST LOW 20 MIN: CPT

## 2024-08-27 RX ORDER — TERCONAZOLE 0.4 %
1 CREAM WITH APPLICATOR VAGINAL NIGHTLY
Qty: 45 G | Refills: 1 | Status: SHIPPED | OUTPATIENT
Start: 2024-08-27 | End: 2024-09-05

## 2024-08-27 NOTE — PROGRESS NOTES
Subjective     Chief Complaint   Patient presents with    Vaginal Discharge     Noticed some vaginal discharge recently thinks it may be a yeast infection       Gianluca Leyva is a 24 y.o.  whose LMP is No LMP recorded. Patient has had an implant. presents with vaginal discharge she noticed a few days ago  No other sx reported        The following portions of the patient's history were reviewed and updated as appropriate:vital signs, allergies, current medications, past medical history, past social history, past surgical history, and problem list      Review of Systems   Pertinent items are noted in HPI.     Objective      /74 (BP Location: Left arm, Patient Position: Sitting)   Wt 74.8 kg (165 lb)   BMI 27.46 kg/m²     Physical Exam    General:   alert   Heart: Not performed today   Lungs: Not performed today.   Breast: Not performed today   Neck: Not performed today   Abdomen: Not performed today   CVA: Not performed today   Pelvis: Self swabbed   Extremities: Extremities normal, atraumatic, no cyanosis or edema   Neurologic: AOx3. Gait normal. Reflexes and motor strength normal and symmetric. Cranial nerves 2-12 and sensation grossly intact.   Psychiatric: Normal affect, judgement, and mood       Lab Review   Labs: No data reviewed    Imaging   No data reviewed    Assessment & Plan     ASSESSMENT  1. Vaginal discharge          PLAN  1.   Orders Placed This Encounter   Procedures    NuSwab VG+ - Swab, Vagina       2. Medications prescribed this encounter:        New Medications Ordered This Visit   Medications    terconazole (TERAZOL 7) 0.4 % vaginal cream     Sig: Insert 1 applicator into the vagina Every Night for 7 days.     Dispense:  45 g     Refill:  1       3. Nuswab collected   Suspect yeast based off s/s  Medication sent to pharmacy  She prefers vaginal tx vs oral  All questions answered and addressed    Follow up: ashley Musa, APRN  2024

## 2024-08-29 LAB
A VAGINAE DNA VAG QL NAA+PROBE: NORMAL SCORE
BVAB2 DNA VAG QL NAA+PROBE: NORMAL SCORE
C ALBICANS DNA VAG QL NAA+PROBE: NEGATIVE
C GLABRATA DNA VAG QL NAA+PROBE: NEGATIVE
C TRACH DNA SPEC QL NAA+PROBE: NEGATIVE
MEGA1 DNA VAG QL NAA+PROBE: NORMAL SCORE
N GONORRHOEA DNA VAG QL NAA+PROBE: NEGATIVE
T VAGINALIS DNA VAG QL NAA+PROBE: NEGATIVE

## 2024-08-30 DIAGNOSIS — B96.89 BV (BACTERIAL VAGINOSIS): Primary | ICD-10-CM

## 2024-08-30 DIAGNOSIS — N76.0 BV (BACTERIAL VAGINOSIS): Primary | ICD-10-CM

## 2024-08-30 RX ORDER — METRONIDAZOLE 7.5 MG/G
1 GEL VAGINAL NIGHTLY
Qty: 70 G | Refills: 0 | Status: SHIPPED | OUTPATIENT
Start: 2024-08-30 | End: 2024-09-04

## 2024-10-15 ENCOUNTER — TELEPHONE (OUTPATIENT)
Dept: OBSTETRICS AND GYNECOLOGY | Age: 24
End: 2024-10-15
Payer: COMMERCIAL

## 2024-10-15 RX ORDER — METRONIDAZOLE 7.5 MG/G
1 GEL VAGINAL NIGHTLY
Qty: 70 G | Refills: 0 | Status: SHIPPED | OUTPATIENT
Start: 2024-10-15 | End: 2024-10-29

## 2024-11-13 ENCOUNTER — CLINICAL SUPPORT (OUTPATIENT)
Dept: OBSTETRICS AND GYNECOLOGY | Age: 24
End: 2024-11-13
Payer: COMMERCIAL

## 2024-11-13 DIAGNOSIS — Z23 NEED FOR HPV VACCINATION: Primary | ICD-10-CM

## 2025-05-07 RX ORDER — FLUTICASONE PROPIONATE 50 MCG
2 SPRAY, SUSPENSION (ML) NASAL DAILY
Qty: 16 G | Refills: 0 | Status: SHIPPED | OUTPATIENT
Start: 2025-05-07 | End: 2026-05-07

## 2025-05-07 RX ORDER — FLUTICASONE PROPIONATE 50 MCG
2 SPRAY, SUSPENSION (ML) NASAL DAILY
Qty: 16 G | Refills: 0 | Status: SHIPPED | OUTPATIENT
Start: 2025-05-07 | End: 2025-05-07 | Stop reason: SDUPTHER

## 2025-05-20 ENCOUNTER — PATIENT ROUNDING (BHMG ONLY) (OUTPATIENT)
Age: 25
End: 2025-05-20
Payer: COMMERCIAL

## 2025-05-20 NOTE — ED NOTES
Thank you for letting us care for you in your recent visit to our [facility] urgent care center. We would love to hear about your experience with us. Was this the first time you have visited our location?         We’re always looking for ways to make our patients’ experiences even better. Do you have any recommendations on ways we may improve?         I appreciate you taking the time to respond. Please be on the lookout for a survey about your recent visit from [employer] via text or email. We would greatly appreciate if you could fill that out and turn it back in. We want your voice to be heard and we value your feedback.    Thank you for choosing [facility] for your healthcare needs.         If you have concerns or would like to speak to me in person regarding your visit please feel free to give me a call. [phone]         Hope you get well soon and thank you.         Amparo Jolley  Practice Manager

## 2025-06-04 ENCOUNTER — OFFICE VISIT (OUTPATIENT)
Dept: OBSTETRICS AND GYNECOLOGY | Age: 25
End: 2025-06-04
Payer: COMMERCIAL

## 2025-06-04 VITALS — HEIGHT: 65 IN | BODY MASS INDEX: 28.62 KG/M2 | DIASTOLIC BLOOD PRESSURE: 70 MMHG | SYSTOLIC BLOOD PRESSURE: 120 MMHG

## 2025-06-04 DIAGNOSIS — N76.0 ACUTE VAGINITIS: ICD-10-CM

## 2025-06-04 DIAGNOSIS — Z77.21 EXPOSURE TO BLOOD OR BODY FLUID: Primary | ICD-10-CM

## 2025-06-04 DIAGNOSIS — Z30.431 IUD CHECK UP: ICD-10-CM

## 2025-06-04 NOTE — PROGRESS NOTES
"Bobbi Leyva is a 24 y.o. female is being seen today for   Chief Complaint   Patient presents with    iud check     Gyn F/u cc: pt here for iud ck/ pt would like std testing    .    History of Present Illness    Here for IUD check and STD testing  Had a new partner last week  No specific concerns, just wanted to be safe  Has charlene  Also wants to check for BV and yeast    The following portions of the patient's history were reviewed and updated as appropriate: allergies, current medications, past family history, past medical history, past social history, past surgical history and problem list.    /70   Ht 165.1 cm (65\")   BMI 28.62 kg/m²         Review of Systems   Constitutional: Negative.    HENT: Negative.     Eyes: Negative.    Respiratory: Negative.     Cardiovascular: Negative.    Gastrointestinal: Negative.    Endocrine: Negative.    Genitourinary: Negative.    Musculoskeletal: Negative.    Skin: Negative.    Allergic/Immunologic: Negative.    Neurological: Negative.    Hematological: Negative.    Psychiatric/Behavioral: Negative.     All other systems reviewed and are negative.      Objective   Physical Exam  Constitutional:       Appearance: She is well-developed.   Abdominal:      Palpations: Abdomen is soft.      Tenderness: There is no abdominal tenderness.   Genitourinary:     Vagina: Normal.      Cervix: No cervical motion tenderness, discharge or friability.      Uterus: Not tender.       Comments: IUD string present  Skin:     General: Skin is warm and dry.   Neurological:      Mental Status: She is alert and oriented to person, place, and time.   Psychiatric:         Behavior: Behavior normal.           Assessment & Plan   Diagnoses and all orders for this visit:    1. Exposure to blood or body fluid (Primary)  -     NuSwab VG+ - Swab, Vagina    2. Acute vaginitis  -     NuSwab VG+ - Swab, Vagina    3. IUD check up      IUD check normal  Pelvic exam normal  Culture sent- may " consider repeating in 2 weeks since exposure was so recent  Declines serum testing today             Total time spent today with Gianluca  was 20 minutes (level 3).  Greater than 50% of the time was spent coordinating care, answering her questions and counseling regarding pathophysiology of her presenting problem along with plans for any diagnositc work-up and treatment.

## 2025-06-09 RX ORDER — METRONIDAZOLE 7.5 MG/G
1 GEL VAGINAL DAILY
Qty: 70 G | Refills: 3 | Status: SHIPPED | OUTPATIENT
Start: 2025-06-09 | End: 2025-06-14

## 2025-06-20 ENCOUNTER — OFFICE VISIT (OUTPATIENT)
Dept: OBSTETRICS AND GYNECOLOGY | Age: 25
End: 2025-06-20
Payer: COMMERCIAL

## 2025-06-20 VITALS
DIASTOLIC BLOOD PRESSURE: 68 MMHG | BODY MASS INDEX: 28.02 KG/M2 | SYSTOLIC BLOOD PRESSURE: 120 MMHG | HEIGHT: 65 IN | WEIGHT: 168.2 LBS

## 2025-06-20 DIAGNOSIS — Z11.3 SCREEN FOR STD (SEXUALLY TRANSMITTED DISEASE): Primary | ICD-10-CM

## 2025-06-20 DIAGNOSIS — K59.00 CONSTIPATION, UNSPECIFIED CONSTIPATION TYPE: ICD-10-CM

## 2025-06-20 DIAGNOSIS — Z83.719 FHX: COLONIC POLYPS: ICD-10-CM

## 2025-06-20 PROCEDURE — 99213 OFFICE O/P EST LOW 20 MIN: CPT | Performed by: STUDENT IN AN ORGANIZED HEALTH CARE EDUCATION/TRAINING PROGRAM

## 2025-06-20 RX ORDER — METRONIDAZOLE 7.5 MG/G
GEL VAGINAL
COMMUNITY
Start: 2025-06-09
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
## 2025-07-03 RX ORDER — VALACYCLOVIR HYDROCHLORIDE 1 G/1
1000 TABLET, FILM COATED ORAL 2 TIMES DAILY
Qty: 60 TABLET | Refills: 3 | Status: SHIPPED | OUTPATIENT
Start: 2025-07-03

## 2025-08-19 ENCOUNTER — CLINICAL SUPPORT (OUTPATIENT)
Dept: OBSTETRICS AND GYNECOLOGY | Age: 25
End: 2025-08-19
Payer: COMMERCIAL

## 2025-08-19 DIAGNOSIS — Z11.3 SCREENING FOR STD (SEXUALLY TRANSMITTED DISEASE): Primary | ICD-10-CM

## 2025-08-21 ENCOUNTER — OFFICE VISIT (OUTPATIENT)
Age: 25
End: 2025-08-21
Payer: COMMERCIAL

## 2025-08-21 VITALS
BODY MASS INDEX: 27.99 KG/M2 | HEIGHT: 65 IN | SYSTOLIC BLOOD PRESSURE: 122 MMHG | WEIGHT: 168 LBS | DIASTOLIC BLOOD PRESSURE: 60 MMHG

## 2025-08-21 DIAGNOSIS — Z83.719 FAMILY HISTORY OF COLONIC POLYPS: ICD-10-CM

## 2025-08-21 DIAGNOSIS — K59.04 CHRONIC IDIOPATHIC CONSTIPATION: Primary | ICD-10-CM

## 2025-08-21 LAB
C TRACH RRNA SPEC QL NAA+PROBE: NEGATIVE
N GONORRHOEA RRNA SPEC QL NAA+PROBE: NEGATIVE
T VAGINALIS RRNA SPEC QL NAA+PROBE: NEGATIVE

## 2025-08-28 ENCOUNTER — OFFICE VISIT (OUTPATIENT)
Dept: OBSTETRICS AND GYNECOLOGY | Age: 25
End: 2025-08-28
Payer: COMMERCIAL

## 2025-08-28 VITALS
WEIGHT: 168.6 LBS | HEIGHT: 65 IN | BODY MASS INDEX: 28.09 KG/M2 | DIASTOLIC BLOOD PRESSURE: 76 MMHG | SYSTOLIC BLOOD PRESSURE: 122 MMHG

## 2025-08-28 DIAGNOSIS — Z01.419 WELL WOMAN EXAM WITH ROUTINE GYNECOLOGICAL EXAM: Primary | ICD-10-CM

## 2025-08-28 DIAGNOSIS — E55.9 VITAMIN D DEFICIENCY: ICD-10-CM

## 2025-08-28 DIAGNOSIS — Z23 NEED FOR HPV VACCINATION: ICD-10-CM

## 2025-08-28 DIAGNOSIS — Z97.5 IUD (INTRAUTERINE DEVICE) IN PLACE: ICD-10-CM

## 2025-08-28 DIAGNOSIS — Q78.0 OSTEOGENESIS IMPERFECTA TYPE I: ICD-10-CM

## (undated) DEVICE — 3M(TM) TEGADERM(TM) TRANSPARENT FILM DRESSING FRAME STYLE 1627: Brand: 3M™ TEGADERM™

## (undated) DEVICE — SUT MNCRYL 0/0 CTX 36IN Y398H

## (undated) DEVICE — SUT MNCRYL PLS ANTIB UD 4/0 PS2 18IN

## (undated) DEVICE — SOL IRR NACL 0.9PCT BT 1000ML

## (undated) DEVICE — APPL HEMO ENDO SURGICEL 2IN1 1P/U STRL

## (undated) DEVICE — GLV SURG BIOGEL LTX PF 6 1/2

## (undated) DEVICE — SUT MNCRYL 0 CT1 36IN UD MCP946H

## (undated) DEVICE — SUT MONOCRYL PLS 3/0 CT1 UD/MF 90CM MCP944H

## (undated) DEVICE — NDL HYPO ECLPS SFTY 18G 1 1/2IN

## (undated) DEVICE — SUT MNCRYL 3/0 KS 27IN UD MCP523H

## (undated) DEVICE — CUFF SCD HEMOFORCE SEQ CALF STD MD

## (undated) DEVICE — SOL IRR H2O BTL 1000ML STRL

## (undated) DEVICE — ANTIBACTERIAL UNDYED BRAIDED (POLYGLACTIN 910), SYNTHETIC ABSORBABLE SUTURE: Brand: COATED VICRYL

## (undated) DEVICE — Device: Brand: PORTEX